# Patient Record
Sex: FEMALE | Race: WHITE | HISPANIC OR LATINO | Employment: UNEMPLOYED | URBAN - METROPOLITAN AREA
[De-identification: names, ages, dates, MRNs, and addresses within clinical notes are randomized per-mention and may not be internally consistent; named-entity substitution may affect disease eponyms.]

---
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
## 2021-02-09 ENCOUNTER — GYNECOLOGY VISIT (OUTPATIENT)
Dept: OBGYN | Facility: CLINIC | Age: 21
End: 2021-02-09

## 2021-02-09 VITALS — SYSTOLIC BLOOD PRESSURE: 118 MMHG | BODY MASS INDEX: 31.32 KG/M2 | DIASTOLIC BLOOD PRESSURE: 62 MMHG | WEIGHT: 200 LBS

## 2021-02-09 DIAGNOSIS — Z34.90 PREGNANCY, UNSPECIFIED GESTATIONAL AGE: ICD-10-CM

## 2021-02-09 PROCEDURE — 99203 OFFICE O/P NEW LOW 30 MIN: CPT | Mod: 25 | Performed by: OBSTETRICS & GYNECOLOGY

## 2021-02-09 PROCEDURE — 76857 US EXAM PELVIC LIMITED: CPT | Performed by: OBSTETRICS & GYNECOLOGY

## 2021-02-09 ASSESSMENT — FIBROSIS 4 INDEX: FIB4 SCORE: 0.21

## 2021-02-09 NOTE — NON-PROVIDER
Pt here for new pt/newob  Pt states  LMP: 09/10/2020  GA:21w6d  Pharmacy verified  Good #[phone]

## 2021-02-09 NOTE — PROGRESS NOTES
CC: Confirmation of viability    Thania Dias,  20 y.o.  female with Patient's last menstrual period was 09/10/2020. presents today with complaint of dysfunctional uterine bleeding.    Subjective : Patient presents to the office for absent menses.    Nausea/Vomiting: Sometimes    Abdominal /pelvic cramping: No  Vaginal bleeding: No  Positive home UPT october  Partner Franklyn  Other symptoms: denies    Pertinent positives documented in HPI and all other systems reviewed & are negative    OB History    Para Term  AB Living   2 1     0 1   SAB TAB Ectopic Molar Multiple Live Births   0       0 1      # Outcome Date GA Lbr Bran/2nd Weight Sex Delivery Anes PTL Lv   2 Current            1 Para 07/10/20 18w3d  0.175 kg (6.2 oz) F Vag-Spont None Y LIZETTE       Past Gyn history: last pap never, hx STDs denies    History reviewed. No pertinent past medical history.    History reviewed. No pertinent surgical history.    Meds: PNV    Allergies: Asa [aspirin]    Physical Exam:    /62   Wt 90.7 kg (200 lb)   LMP 09/10/2020   Breastfeeding No   BMI 31.32 kg/m²   Gen: well-appearing, well-hydrated, well-nourished  Abd: abdomen is soft without significant tenderness, masses, organomegaly or guarding  Pelvic: deferred  Ext: NT bilaterally, no cyanosis, clubbing or edema    No results found for this or any previous visit (from the past 336 hour(s)).    Transabdominal US performed and per my read:    Indication: confirmation of pregnancy     Findings: fink intrauterine pregnancy @ 21+6 by AC, HC, BPD, and FL  Positive fetal cardiac activity @ 156 BPM.   Normal amniotic fluid    Impression: viable IUP @ 21+5. EDC 2021 by LMP= 21 wk US      Assessment:  20 y.o. here for viability  Pregnancy exam/test positive   @ 21+5. EDC 2021 by LMP= 21 wk US    Plan:  1 week for new OB appt - labs ordered  US for anatomy ordered   Normal pregnancy symptoms discussed  SAB/labor precautions  educated  Call office w/ questions or concerns

## 2021-02-11 ENCOUNTER — HOSPITAL ENCOUNTER (OUTPATIENT)
Dept: LAB | Facility: MEDICAL CENTER | Age: 21
End: 2021-02-11
Attending: OBSTETRICS & GYNECOLOGY
Payer: COMMERCIAL

## 2021-02-11 ENCOUNTER — APPOINTMENT (OUTPATIENT)
Dept: OBGYN | Facility: CLINIC | Age: 21
End: 2021-02-11
Payer: MEDICAID

## 2021-02-11 DIAGNOSIS — Z34.90 PREGNANCY, UNSPECIFIED GESTATIONAL AGE: ICD-10-CM

## 2021-02-11 LAB
ABO GROUP BLD: NORMAL
APPEARANCE UR: CLEAR
BASOPHILS # BLD AUTO: 0.3 % (ref 0–1.8)
BASOPHILS # BLD: 0.02 K/UL (ref 0–0.12)
BILIRUB UR QL STRIP.AUTO: NEGATIVE
BLD GP AB SCN SERPL QL: NORMAL
COLOR UR: YELLOW
EOSINOPHIL # BLD AUTO: 0.03 K/UL (ref 0–0.51)
EOSINOPHIL NFR BLD: 0.4 % (ref 0–6.9)
ERYTHROCYTE [DISTWIDTH] IN BLOOD BY AUTOMATED COUNT: 42.6 FL (ref 35.9–50)
GLUCOSE UR STRIP.AUTO-MCNC: NEGATIVE MG/DL
HBV SURFACE AG SER QL: ABNORMAL
HCT VFR BLD AUTO: 37.5 % (ref 37–47)
HGB BLD-MCNC: 12.6 G/DL (ref 12–16)
HIV 1+2 AB+HIV1 P24 AG SERPL QL IA: NORMAL
IMM GRANULOCYTES # BLD AUTO: 0.03 K/UL (ref 0–0.11)
IMM GRANULOCYTES NFR BLD AUTO: 0.4 % (ref 0–0.9)
KETONES UR STRIP.AUTO-MCNC: NEGATIVE MG/DL
LEUKOCYTE ESTERASE UR QL STRIP.AUTO: NEGATIVE
LYMPHOCYTES # BLD AUTO: 1.14 K/UL (ref 1–4.8)
LYMPHOCYTES NFR BLD: 15.9 % (ref 22–41)
MCH RBC QN AUTO: 30.7 PG (ref 27–33)
MCHC RBC AUTO-ENTMCNC: 33.6 G/DL (ref 33.6–35)
MCV RBC AUTO: 91.5 FL (ref 81.4–97.8)
MICRO URNS: ABNORMAL
MONOCYTES # BLD AUTO: 0.49 K/UL (ref 0–0.85)
MONOCYTES NFR BLD AUTO: 6.8 % (ref 0–13.4)
NEUTROPHILS # BLD AUTO: 5.48 K/UL (ref 2–7.15)
NEUTROPHILS NFR BLD: 76.2 % (ref 44–72)
NITRITE UR QL STRIP.AUTO: NEGATIVE
NRBC # BLD AUTO: 0 K/UL
NRBC BLD-RTO: 0 /100 WBC
PH UR STRIP.AUTO: 8 [PH] (ref 5–8)
PLATELET # BLD AUTO: 296 K/UL (ref 164–446)
PMV BLD AUTO: 11.7 FL (ref 9–12.9)
PROT UR QL STRIP: NEGATIVE MG/DL
RBC # BLD AUTO: 4.1 M/UL (ref 4.2–5.4)
RBC UR QL AUTO: NEGATIVE
RH BLD: NORMAL
RUBV AB SER QL: 46 IU/ML
SP GR UR STRIP.AUTO: 1.02
TREPONEMA PALLIDUM IGG+IGM AB [PRESENCE] IN SERUM OR PLASMA BY IMMUNOASSAY: ABNORMAL
UROBILINOGEN UR STRIP.AUTO-MCNC: 0.2 MG/DL
WBC # BLD AUTO: 7.2 K/UL (ref 4.8–10.8)

## 2021-02-12 LAB
C TRACH DNA SPEC QL NAA+PROBE: NEGATIVE
N GONORRHOEA DNA SPEC QL NAA+PROBE: NEGATIVE
SPECIMEN SOURCE: NORMAL

## 2021-02-13 LAB
AMPHETAMINES UR QL: NEGATIVE NG/ML
BACTERIA UR CULT: NORMAL
BARBITURATES UR QL: NEGATIVE NG/ML
BENZODIAZ UR QL: NEGATIVE NG/ML
CANNABINOIDS UR QL SCN: NEGATIVE NG/ML
COCAINE UR QL: NEGATIVE NG/ML
DRUG SCREEN COMMENT UR-IMP: NORMAL
MDMA CTO UR SCN-MCNC: NEGATIVE NG/ML
METHADONE UR QL: NEGATIVE NG/ML
OPIATES UR QL: NEGATIVE NG/ML
OXYCODONE CTO UR SCN-MCNC: NEGATIVE NG/ML
PCP UR QL SCN: NEGATIVE NG/ML
PROPOXYPH UR QL: NEGATIVE NG/ML
SIGNIFICANT IND 70042: NORMAL
SITE SITE: NORMAL
SOURCE SOURCE: NORMAL

## 2021-02-14 LAB
# FETUSES US: NORMAL
AFP MOM SERPL: 0.59
AFP SERPL-MCNC: 33 NG/ML
AGE - REPORTED: 19.8 YR
CURRENT SMOKER: NO
FAMILY MEMBER DISEASES HX: NO
GA METHOD: NORMAL
GA: NORMAL WK
HCG MOM SERPL: 0.49
HCG SERPL-ACNC: 9190 IU/L
HX OF HEREDITARY DISORDERS: NO
IDDM PATIENT QL: NO
INHIBIN A MOM SERPL: 0.63
INHIBIN A SERPL-MCNC: 103 PG/ML
INTEGRATED SCN PATIENT-IMP: NORMAL
PATHOLOGY STUDY: NORMAL
SPECIMEN DRAWN SERPL: NORMAL
U ESTRIOL MOM SERPL: 0.95
U ESTRIOL SERPL-MCNC: 2.72 NG/ML

## 2021-02-16 ENCOUNTER — INITIAL PRENATAL (OUTPATIENT)
Dept: OBGYN | Facility: CLINIC | Age: 21
End: 2021-02-16

## 2021-02-16 VITALS
BODY MASS INDEX: 31.64 KG/M2 | SYSTOLIC BLOOD PRESSURE: 122 MMHG | HEIGHT: 67 IN | DIASTOLIC BLOOD PRESSURE: 68 MMHG | WEIGHT: 201.6 LBS

## 2021-02-16 DIAGNOSIS — Z34.90 PRENATAL CARE, ANTEPARTUM: ICD-10-CM

## 2021-02-16 DIAGNOSIS — Z87.59 HISTORY OF FETAL DEMISE, NOT CURRENTLY PREGNANT: ICD-10-CM

## 2021-02-16 LAB
APPEARANCE UR: NORMAL
BILIRUB UR STRIP-MCNC: NORMAL MG/DL
COLOR UR AUTO: NORMAL
GLUCOSE UR STRIP.AUTO-MCNC: NEGATIVE MG/DL
KETONES UR STRIP.AUTO-MCNC: NEGATIVE MG/DL
LEUKOCYTE ESTERASE UR QL STRIP.AUTO: NEGATIVE
NITRITE UR QL STRIP.AUTO: NORMAL
PH UR STRIP.AUTO: 6.5 [PH] (ref 5–8)
PROT UR QL STRIP: NEGATIVE MG/DL
RBC UR QL AUTO: NEGATIVE
SP GR UR STRIP.AUTO: 1.02
UROBILINOGEN UR STRIP-MCNC: NORMAL MG/DL

## 2021-02-16 PROCEDURE — 81002 URINALYSIS NONAUTO W/O SCOPE: CPT | Performed by: NURSE PRACTITIONER

## 2021-02-16 PROCEDURE — 0500F INITIAL PRENATAL CARE VISIT: CPT | Performed by: NURSE PRACTITIONER

## 2021-02-16 PROCEDURE — 90686 IIV4 VACC NO PRSV 0.5 ML IM: CPT | Performed by: NURSE PRACTITIONER

## 2021-02-16 PROCEDURE — 90471 IMMUNIZATION ADMIN: CPT | Performed by: NURSE PRACTITIONER

## 2021-02-16 ASSESSMENT — EDINBURGH POSTNATAL DEPRESSION SCALE (EPDS)
I HAVE LOOKED FORWARD WITH ENJOYMENT TO THINGS: AS MUCH AS I EVER DID
TOTAL SCORE: 0
THINGS HAVE BEEN GETTING ON TOP OF ME: NO, I HAVE BEEN COPING AS WELL AS EVER
I HAVE BEEN ABLE TO LAUGH AND SEE THE FUNNY SIDE OF THINGS: AS MUCH AS I ALWAYS COULD
THE THOUGHT OF HARMING MYSELF HAS OCCURRED TO ME: NEVER
I HAVE BEEN SO UNHAPPY THAT I HAVE HAD DIFFICULTY SLEEPING: NOT AT ALL
I HAVE BEEN SO UNHAPPY THAT I HAVE BEEN CRYING: NO, NEVER
I HAVE BEEN ANXIOUS OR WORRIED FOR NO GOOD REASON: NO, NOT AT ALL
I HAVE FELT SAD OR MISERABLE: NO, NOT AT ALL
I HAVE FELT SCARED OR PANICKY FOR NO GOOD REASON: NO, NOT AT ALL
I HAVE BLAMED MYSELF UNNECESSARILY WHEN THINGS WENT WRONG: NO, NEVER

## 2021-02-16 ASSESSMENT — FIBROSIS 4 INDEX: FIB4 SCORE: 0.23

## 2021-02-17 NOTE — PROGRESS NOTES
NOB today    LMP: 09/10/2020  Last pap: n/a  Phone # [phone]  Pharmacy confirmed  On PNV  Cystic Fibrosis test offered and Declined   Flu vaccine offered and given  AFP done 2021 Negative    Pt has anatomy u/s scheduled 2021  C/o:  Pt has no questions or concerns at this time     Flu Vaccine  NDC: 70583-803-91  LOT#: [id number]  Expiration Date: 2021    Dose: 0.5ML  Site: Right Arm    Patient educated on use and side effects of medication. Name and  verified prior to injection. Pt tolerated? YES     Verified by : KS    Administered by Griselda Padilla, Med Ass't at 4:24 PM.    Patient Provided Medication: no

## 2021-02-17 NOTE — PROGRESS NOTES
Subjective:   Thania Dias is a 20 y.o.  who presents for her new OB exam.  She is 22w5d with an LOUISE of Estimated Date of Delivery: 21 by LMP she was tracking. She is feeling well and has no concerns at this time. Denies VB, LOF, contractions or pain. No ER visits or previous care in this pregnancy. Denies dysuria, vaginal DC, fever. Reports  fetal movement. AFP WNL.  Declines CF.  No NIPT testing kits in clinic today.      History reviewed. No pertinent past medical history.    Psych Hx: Patient denies any history of depression, anxiety, PTSD, bipolar or any other psychological issues.       Past Surgical History:   Procedure Laterality Date   • OPEN REDUCTION      (L) wrist radius 2018        OB History    Para Term  AB Living   2 1     0 1   SAB TAB Ectopic Molar Multiple Live Births   0       0 1      # Outcome Date GA Lbr Bran/2nd Weight Sex Delivery Anes PTL Lv   2 Current            1 Para 07/10/20 18w3d  0.175 kg (6.2 oz) F Vag-Spont None Y LIZETTE        Gynecological Hx: Denies any hx of STIs, including HSV. Denies any vulvovaginal disorders and no hx of abnormal cervical cytology. Last pap n/a    Sexual Hx: One current male partner, who is FOB, same from other pregnancy.    Contraceptive Hx: Has not used in the past and has since discontinued use.     Family History   Problem Relation Age of Onset   • No Known Problems Mother    • No Known Problems Father    • No Known Problems Brother      Denies any genetic disorders in family history.     Social History     Socioeconomic History   • Marital status: Single     Spouse name: Not on file   • Number of children: Not on file   • Years of education: Not on file   • Highest education level: Not on file   Occupational History   • Not on file   Tobacco Use   • Smoking status: Never Smoker   • Smokeless tobacco: Never Used   Substance and Sexual Activity   • Alcohol use: Not Currently   • Drug use: Not Currently     Types:  "Marijuana, Inhaled     Comment: 07/2020   • Sexual activity: Not Currently     Partners: Male     Comment: Pregnancy Planned and Desired    Other Topics Concern   • Not on file   Social History Narrative   • Not on file     Social Determinants of Health     Financial Resource Strain:    • Difficulty of Paying Living Expenses:    Food Insecurity:    • Worried About Running Out of Food in the Last Year:    • Ran Out of Food in the Last Year:    Transportation Needs:    • Lack of Transportation (Medical):    • Lack of Transportation (Non-Medical):    Physical Activity:    • Days of Exercise per Week:    • Minutes of Exercise per Session:    Stress:    • Feeling of Stress :    Social Connections:    • Frequency of Communication with Friends and Family:    • Frequency of Social Gatherings with Friends and Family:    • Attends Hinduism Services:    • Active Member of Clubs or Organizations:    • Attends Club or Organization Meetings:    • Marital Status:    Intimate Partner Violence:    • Fear of Current or Ex-Partner:    • Emotionally Abused:    • Physically Abused:    • Sexually Abused:        FOB is involved and lives with Thania Dias.  Pregnancy is planned.    She is currently not working, denies any heavy lifting or exposure to potential teratogens like environmental or occupational toxins.   Denies alcohol use, drug use, or tobacco use in pregnancy.   Denies any current or hx of sexual, emotional or physical abuse or trauma.     Current Medications: PNV   Allergies aspirin     Objective:      Vitals:    02/16/21 1557   BP: 122/68   Weight: 91.4 kg (201 lb 9.6 oz)   Height: 1.702 m (5' 7\")        See Prenatal Physical and Prenatal Vitals  UA WNL today      Assessment:      1.  IUP @ 22w5d per LMP      2.  S=D      3.  See problem list as follows       Patient Active Problem List    Diagnosis Date Noted   • History of fetal demise at 18 weeks 02/16/2021         Plan:   - GC/CT done by urine  - AFP neg, " no NIPT kits in clinic today   - Prenatal labs ordered - lab slip given  - Discussed PNV, nutrition, adequate water intake, and exercise/weight gain in pregnancy  - NOB informational packet with anticipatory guidance given  - Information on Centering Pregnancy given, groups cancelled until further notice due to COVID-19   - S/sx of pregnancy warning signs and PTL precautions given  - Complete OB US already scheduled  - Return to [facility] in 4 weeks

## 2021-02-24 ENCOUNTER — ROUTINE PRENATAL (OUTPATIENT)
Dept: OBGYN | Facility: CLINIC | Age: 21
End: 2021-02-24

## 2021-02-24 ENCOUNTER — APPOINTMENT (OUTPATIENT)
Dept: RADIOLOGY | Facility: IMAGING CENTER | Age: 21
End: 2021-02-24
Attending: OBSTETRICS & GYNECOLOGY
Payer: MEDICAID

## 2021-02-24 ENCOUNTER — HOSPITAL ENCOUNTER (INPATIENT)
Facility: MEDICAL CENTER | Age: 21
LOS: 19 days | DRG: 832 | End: 2021-03-15
Attending: OBSTETRICS & GYNECOLOGY | Admitting: OBSTETRICS & GYNECOLOGY
Payer: MEDICAID

## 2021-02-24 DIAGNOSIS — O34.32 PREMATURE CERVICAL DILATION IN SECOND TRIMESTER: ICD-10-CM

## 2021-02-24 DIAGNOSIS — Z34.90 PREGNANCY, UNSPECIFIED GESTATIONAL AGE: ICD-10-CM

## 2021-02-24 LAB
ALBUMIN SERPL BCP-MCNC: 3.8 G/DL (ref 3.2–4.9)
ALBUMIN/GLOB SERPL: 1 G/DL
ALP SERPL-CCNC: 90 U/L (ref 30–99)
ALT SERPL-CCNC: 10 U/L (ref 2–50)
ANION GAP SERPL CALC-SCNC: 12 MMOL/L (ref 7–16)
APPEARANCE UR: CLEAR
AST SERPL-CCNC: 13 U/L (ref 12–45)
BASOPHILS # BLD AUTO: 0.2 % (ref 0–1.8)
BASOPHILS # BLD: 0.02 K/UL (ref 0–0.12)
BILIRUB SERPL-MCNC: <0.2 MG/DL (ref 0.1–1.5)
BILIRUB UR QL STRIP.AUTO: NEGATIVE
BUN SERPL-MCNC: 7 MG/DL (ref 8–22)
CALCIUM SERPL-MCNC: 9.9 MG/DL (ref 8.5–10.5)
CHLORIDE SERPL-SCNC: 102 MMOL/L (ref 96–112)
CO2 SERPL-SCNC: 22 MMOL/L (ref 20–33)
COLOR UR: YELLOW
CREAT SERPL-MCNC: 0.54 MG/DL (ref 0.5–1.4)
EOSINOPHIL # BLD AUTO: 0.04 K/UL (ref 0–0.51)
EOSINOPHIL NFR BLD: 0.5 % (ref 0–6.9)
ERYTHROCYTE [DISTWIDTH] IN BLOOD BY AUTOMATED COUNT: 42.7 FL (ref 35.9–50)
GLOBULIN SER CALC-MCNC: 3.9 G/DL (ref 1.9–3.5)
GLUCOSE BLD-MCNC: 152 MG/DL (ref 65–99)
GLUCOSE SERPL-MCNC: 88 MG/DL (ref 65–99)
GLUCOSE UR STRIP.AUTO-MCNC: NEGATIVE MG/DL
HCT VFR BLD AUTO: 39.3 % (ref 37–47)
HGB BLD-MCNC: 13.2 G/DL (ref 12–16)
HOLDING TUBE BB 8507: NORMAL
IMM GRANULOCYTES # BLD AUTO: 0.04 K/UL (ref 0–0.11)
IMM GRANULOCYTES NFR BLD AUTO: 0.5 % (ref 0–0.9)
KETONES UR STRIP.AUTO-MCNC: NEGATIVE MG/DL
LEUKOCYTE ESTERASE UR QL STRIP.AUTO: NEGATIVE
LYMPHOCYTES # BLD AUTO: 1.28 K/UL (ref 1–4.8)
LYMPHOCYTES NFR BLD: 15 % (ref 22–41)
MCH RBC QN AUTO: 30.9 PG (ref 27–33)
MCHC RBC AUTO-ENTMCNC: 33.6 G/DL (ref 33.6–35)
MCV RBC AUTO: 92 FL (ref 81.4–97.8)
MICRO URNS: NORMAL
MONOCYTES # BLD AUTO: 0.47 K/UL (ref 0–0.85)
MONOCYTES NFR BLD AUTO: 5.5 % (ref 0–13.4)
NEUTROPHILS # BLD AUTO: 6.66 K/UL (ref 2–7.15)
NEUTROPHILS NFR BLD: 78.3 % (ref 44–72)
NITRITE UR QL STRIP.AUTO: NEGATIVE
NRBC # BLD AUTO: 0 K/UL
NRBC BLD-RTO: 0 /100 WBC
PH UR STRIP.AUTO: 7.5 [PH] (ref 5–8)
PLATELET # BLD AUTO: 283 K/UL (ref 164–446)
PMV BLD AUTO: 11.1 FL (ref 9–12.9)
POTASSIUM SERPL-SCNC: 4 MMOL/L (ref 3.6–5.5)
PROT SERPL-MCNC: 7.7 G/DL (ref 6–8.2)
PROT UR QL STRIP: NEGATIVE MG/DL
RBC # BLD AUTO: 4.27 M/UL (ref 4.2–5.4)
RBC UR QL AUTO: NEGATIVE
SARS-COV+SARS-COV-2 AG RESP QL IA.RAPID: NOTDETECTED
SARS-COV-2 RNA RESP QL NAA+PROBE: NOTDETECTED
SODIUM SERPL-SCNC: 136 MMOL/L (ref 135–145)
SP GR UR STRIP.AUTO: 1.01
SPECIMEN SOURCE: NORMAL
SPECIMEN SOURCE: NORMAL
UROBILINOGEN UR STRIP.AUTO-MCNC: 0.2 MG/DL
WBC # BLD AUTO: 8.5 K/UL (ref 4.8–10.8)

## 2021-02-24 PROCEDURE — 82962 GLUCOSE BLOOD TEST: CPT

## 2021-02-24 PROCEDURE — 700105 HCHG RX REV CODE 258: Performed by: OBSTETRICS & GYNECOLOGY

## 2021-02-24 PROCEDURE — 76805 OB US >/= 14 WKS SNGL FETUS: CPT | Mod: TC | Performed by: OBSTETRICS & GYNECOLOGY

## 2021-02-24 PROCEDURE — 700111 HCHG RX REV CODE 636 W/ 250 OVERRIDE (IP)

## 2021-02-24 PROCEDURE — 87426 SARSCOV CORONAVIRUS AG IA: CPT

## 2021-02-24 PROCEDURE — U0005 INFEC AGEN DETEC AMPLI PROBE: HCPCS

## 2021-02-24 PROCEDURE — 700111 HCHG RX REV CODE 636 W/ 250 OVERRIDE (IP): Performed by: OBSTETRICS & GYNECOLOGY

## 2021-02-24 PROCEDURE — 85025 COMPLETE CBC W/AUTO DIFF WBC: CPT

## 2021-02-24 PROCEDURE — 59025 FETAL NON-STRESS TEST: CPT | Mod: 26 | Performed by: OBSTETRICS & GYNECOLOGY

## 2021-02-24 PROCEDURE — 36415 COLL VENOUS BLD VENIPUNCTURE: CPT

## 2021-02-24 PROCEDURE — 80053 COMPREHEN METABOLIC PANEL: CPT

## 2021-02-24 PROCEDURE — 700105 HCHG RX REV CODE 258

## 2021-02-24 PROCEDURE — 302128 INFUSION PUMP: Performed by: OBSTETRICS & GYNECOLOGY

## 2021-02-24 PROCEDURE — 76817 TRANSVAGINAL US OBSTETRIC: CPT | Mod: TC | Performed by: OBSTETRICS & GYNECOLOGY

## 2021-02-24 PROCEDURE — 770002 HCHG ROOM/CARE - OB PRIVATE (112)

## 2021-02-24 PROCEDURE — 90040 PR PRENATAL FOLLOW UP: CPT | Performed by: OBSTETRICS & GYNECOLOGY

## 2021-02-24 PROCEDURE — 83735 ASSAY OF MAGNESIUM: CPT

## 2021-02-24 PROCEDURE — A9270 NON-COVERED ITEM OR SERVICE: HCPCS | Performed by: OBSTETRICS & GYNECOLOGY

## 2021-02-24 PROCEDURE — 302790 HCHG STAT ANTEPARTUM CARE, DAILY

## 2021-02-24 PROCEDURE — 81003 URINALYSIS AUTO W/O SCOPE: CPT

## 2021-02-24 PROCEDURE — 96372 THER/PROPH/DIAG INJ SC/IM: CPT

## 2021-02-24 PROCEDURE — 700102 HCHG RX REV CODE 250 W/ 637 OVERRIDE(OP): Performed by: OBSTETRICS & GYNECOLOGY

## 2021-02-24 PROCEDURE — 99221 1ST HOSP IP/OBS SF/LOW 40: CPT | Mod: 25 | Performed by: OBSTETRICS & GYNECOLOGY

## 2021-02-24 PROCEDURE — U0003 INFECTIOUS AGENT DETECTION BY NUCLEIC ACID (DNA OR RNA); SEVERE ACUTE RESPIRATORY SYNDROME CORONAVIRUS 2 (SARS-COV-2) (CORONAVIRUS DISEASE [COVID-19]), AMPLIFIED PROBE TECHNIQUE, MAKING USE OF HIGH THROUGHPUT TECHNOLOGIES AS DESCRIBED BY CMS-2020-01-R: HCPCS

## 2021-02-24 RX ORDER — PENICILLIN G POTASSIUM 5000000 [IU]/1
2.5 INJECTION, POWDER, FOR SOLUTION INTRAMUSCULAR; INTRAVENOUS EVERY 4 HOURS
Status: DISCONTINUED | OUTPATIENT
Start: 2021-02-25 | End: 2021-02-25

## 2021-02-24 RX ORDER — MAGNESIUM SULFATE HEPTAHYDRATE 40 MG/ML
INJECTION, SOLUTION INTRAVENOUS
Status: COMPLETED
Start: 2021-02-24 | End: 2021-02-24

## 2021-02-24 RX ORDER — MAGNESIUM SULFATE HEPTAHYDRATE 40 MG/ML
4 INJECTION, SOLUTION INTRAVENOUS ONCE
Status: COMPLETED | OUTPATIENT
Start: 2021-02-24 | End: 2021-02-24

## 2021-02-24 RX ORDER — INDOMETHACIN 50 MG/1
50 CAPSULE ORAL
Status: DISCONTINUED | OUTPATIENT
Start: 2021-02-24 | End: 2021-02-26

## 2021-02-24 RX ORDER — MAGNESIUM SULFATE HEPTAHYDRATE 40 MG/ML
2 INJECTION, SOLUTION INTRAVENOUS ONCE
Status: COMPLETED | OUTPATIENT
Start: 2021-02-24 | End: 2021-02-24

## 2021-02-24 RX ORDER — SODIUM CHLORIDE, SODIUM LACTATE, POTASSIUM CHLORIDE, CALCIUM CHLORIDE 600; 310; 30; 20 MG/100ML; MG/100ML; MG/100ML; MG/100ML
INJECTION, SOLUTION INTRAVENOUS CONTINUOUS
Status: DISCONTINUED | OUTPATIENT
Start: 2021-02-24 | End: 2021-02-24

## 2021-02-24 RX ORDER — INDOMETHACIN 50 MG/1
50 CAPSULE ORAL
Status: DISCONTINUED | OUTPATIENT
Start: 2021-02-24 | End: 2021-02-24

## 2021-02-24 RX ORDER — PENICILLIN G POTASSIUM 5000000 [IU]/1
5 INJECTION, POWDER, FOR SOLUTION INTRAMUSCULAR; INTRAVENOUS ONCE
Status: COMPLETED | OUTPATIENT
Start: 2021-02-24 | End: 2021-02-24

## 2021-02-24 RX ORDER — MAGNESIUM SULFATE HEPTAHYDRATE 40 MG/ML
2 INJECTION, SOLUTION INTRAVENOUS CONTINUOUS
Status: DISCONTINUED | OUTPATIENT
Start: 2021-02-24 | End: 2021-02-27

## 2021-02-24 RX ORDER — SODIUM CHLORIDE, SODIUM LACTATE, POTASSIUM CHLORIDE, CALCIUM CHLORIDE 600; 310; 30; 20 MG/100ML; MG/100ML; MG/100ML; MG/100ML
INJECTION, SOLUTION INTRAVENOUS CONTINUOUS
Status: DISCONTINUED | OUTPATIENT
Start: 2021-02-24 | End: 2021-02-26

## 2021-02-24 RX ORDER — ONDANSETRON 2 MG/ML
4 INJECTION INTRAMUSCULAR; INTRAVENOUS EVERY 6 HOURS PRN
Status: DISCONTINUED | OUTPATIENT
Start: 2021-02-24 | End: 2021-03-15 | Stop reason: HOSPADM

## 2021-02-24 RX ORDER — SODIUM CHLORIDE 9 MG/ML
INJECTION, SOLUTION INTRAVENOUS
Status: COMPLETED
Start: 2021-02-24 | End: 2021-02-24

## 2021-02-24 RX ORDER — BETAMETHASONE SODIUM PHOSPHATE AND BETAMETHASONE ACETATE 3; 3 MG/ML; MG/ML
12 INJECTION, SUSPENSION INTRA-ARTICULAR; INTRALESIONAL; INTRAMUSCULAR; SOFT TISSUE EVERY 24 HOURS
Status: COMPLETED | OUTPATIENT
Start: 2021-02-24 | End: 2021-02-25

## 2021-02-24 RX ADMIN — MAGNESIUM SULFATE IN WATER 4 G: 40 INJECTION, SOLUTION INTRAVENOUS at 19:09

## 2021-02-24 RX ADMIN — SODIUM CHLORIDE, POTASSIUM CHLORIDE, SODIUM LACTATE AND CALCIUM CHLORIDE: 600; 310; 30; 20 INJECTION, SOLUTION INTRAVENOUS at 17:26

## 2021-02-24 RX ADMIN — MAGNESIUM SULFATE IN WATER 2 G/HR: 40 INJECTION, SOLUTION INTRAVENOUS at 19:39

## 2021-02-24 RX ADMIN — INDOMETHACIN 50 MG: 50 CAPSULE ORAL at 20:53

## 2021-02-24 RX ADMIN — MAGNESIUM SULFATE IN WATER 2 G: 40 INJECTION, SOLUTION INTRAVENOUS at 19:29

## 2021-02-24 RX ADMIN — MAGNESIUM SULFATE HEPTAHYDRATE 2 G/HR: 40 INJECTION, SOLUTION INTRAVENOUS at 19:39

## 2021-02-24 RX ADMIN — SODIUM CHLORIDE 100 ML: 900 INJECTION INTRAVENOUS at 20:54

## 2021-02-24 RX ADMIN — BETAMETHASONE ACETATE AND BETAMETHASONE SODIUM PHOSPHATE 12 MG: 3; 3 INJECTION, SUSPENSION INTRA-ARTICULAR; INTRALESIONAL; INTRAMUSCULAR; SOFT TISSUE at 15:30

## 2021-02-24 RX ADMIN — MAGNESIUM SULFATE HEPTAHYDRATE 2 G: 40 INJECTION, SOLUTION INTRAVENOUS at 19:29

## 2021-02-24 RX ADMIN — PENICILLIN G POTASSIUM 5 MILLION UNITS: 5000000 POWDER, FOR SOLUTION INTRAMUSCULAR; INTRAPLEURAL; INTRATHECAL; INTRAVENOUS at 20:54

## 2021-02-24 RX ADMIN — INDOMETHACIN 50 MG: 50 CAPSULE ORAL at 16:13

## 2021-02-24 ASSESSMENT — LIFESTYLE VARIABLES
EVER_SMOKED: NEVER
ALCOHOL_USE: NO

## 2021-02-24 ASSESSMENT — FIBROSIS 4 INDEX: FIB4 SCORE: 0.23

## 2021-02-24 NOTE — H&P
ANTEPARTUM HISTORY AND PHYSICAL NOTE;    Thania Dias is a 20 y.o. female  at 23w6d.  Patient was sent from women's health on [facility]-patient had ultrasound today-fetal survey.  Cervical measurement showed cervix almost completely effaced.  Cervical exam performed by Dr. Moyer-1 cm / 80% effaced patient denies uterine contractions or leakage of fluid denies vaginal bleeding having good fetal movement  Patient did have previous pregnancy complicated by rapid dilatation to complete with contractions at 18 weeks with her last pregnancy.    Patient Active Problem List    Diagnosis Date Noted   • History of fetal demise at 18 weeks 2021       Review of systems; denies vaginal bleeding, leakage of fluid, uterine contractions, fever chills or abdominal pain  No past medical history on file.  Past Surgical History:   Procedure Laterality Date   • OPEN REDUCTION      (L) wrist radius 2018     Asa [aspirin]  Social History     Socioeconomic History   • Marital status: Single     Spouse name: Not on file   • Number of children: Not on file   • Years of education: Not on file   • Highest education level: Not on file   Occupational History   • Not on file   Tobacco Use   • Smoking status: Never Smoker   • Smokeless tobacco: Never Used   Substance and Sexual Activity   • Alcohol use: Not Currently   • Drug use: Not Currently     Types: Marijuana, Inhaled     Comment: 2020   • Sexual activity: Not Currently     Partners: Male     Comment: Pregnancy Planned and Desired    Other Topics Concern   • Not on file   Social History Narrative   • Not on file     Social Determinants of Health     Financial Resource Strain:    • Difficulty of Paying Living Expenses:    Food Insecurity:    • Worried About Running Out of Food in the Last Year:    • Ran Out of Food in the Last Year:    Transportation Needs:    • Lack of Transportation (Medical):    • Lack of Transportation (Non-Medical):    Physical Activity:   "  • Days of Exercise per Week:    • Minutes of Exercise per Session:    Stress:    • Feeling of Stress :    Social Connections:    • Frequency of Communication with Friends and Family:    • Frequency of Social Gatherings with Friends and Family:    • Attends Mandaen Services:    • Active Member of Clubs or Organizations:    • Attends Club or Organization Meetings:    • Marital Status:    Intimate Partner Violence:    • Fear of Current or Ex-Partner:    • Emotionally Abused:    • Physically Abused:    • Sexually Abused:          Physical examination;  Alert and oriented x3  Gen.-well-developed well-nourished female in no apparent distress  HEENT-normocephalic, nontraumatic,EOMI,PERRLA  /69   Pulse 96   Temp 36.7 °C (98.1 °F) (Temporal)   Ht 1.702 m (5' 7\")   Wt 91.4 kg (201 lb 9.6 oz)   LMP 09/10/2020   SpO2 98%   BMI 31.57 kg/m²   Skin is warm and dry  Back-negative for CVA tenderness  Cardiovascular-regular rate and rhythm, normal S1-S2 no murmurs gallops  Lungs-clear to auscultation bilaterally  Abdomen-nondistended positive bowel sounds soft nontender without masses or hepatosplenomegaly  Cervix-1 cm / 80% effaced per Dr. Moyer  Extremities without cyanosis clubbing or edema  Neurologic grossly intact    Labs;      NST-as performed and read by myself; reactive NST without contractions    Impression;  IUP AT 23w6d   labor versus incompetent cervix  History of previous 18-week delivery    Plan;  Admission for close observation  Betamethasone series  Indomethacin 50 mg p.o. every 6 hours x72 hours  [facility] consultation for possible cerclage        Jason Mercado MD  "

## 2021-02-24 NOTE — PROGRESS NOTES
Had US today and sent over by Graciela due to no measurable cervix and the cervix being open  .75cm.  Pt does not feel ctx or cramping, no signs or symptoms of infection.     Has a hx of 18 week loss, placenta confirmed chorio.  No other pregnancies.    SVE 1/80/floating    Provided options to the patient and this is a very desired pregnancy for her and she would like all options done.  She will proceed to L&D for evaluation by MFM for possible cerclage if deemed appropriate. Beta to be given.      Verona Moyer D.O.

## 2021-02-25 LAB
GLUCOSE BLD-MCNC: 130 MG/DL (ref 65–99)
GLUCOSE BLD-MCNC: 138 MG/DL (ref 65–99)
GLUCOSE BLD-MCNC: 138 MG/DL (ref 65–99)
GLUCOSE BLD-MCNC: 142 MG/DL (ref 65–99)
MAGNESIUM SERPL-MCNC: 5 MG/DL (ref 1.5–2.5)
MAGNESIUM SERPL-MCNC: 5.8 MG/DL (ref 1.5–2.5)
MAGNESIUM SERPL-MCNC: 6.1 MG/DL (ref 1.5–2.5)
MAGNESIUM SERPL-MCNC: 6.4 MG/DL (ref 1.5–2.5)
MAGNESIUM SERPL-MCNC: 7.2 MG/DL (ref 1.5–2.5)

## 2021-02-25 PROCEDURE — 36415 COLL VENOUS BLD VENIPUNCTURE: CPT

## 2021-02-25 PROCEDURE — 770002 HCHG ROOM/CARE - OB PRIVATE (112)

## 2021-02-25 PROCEDURE — 99232 SBSQ HOSP IP/OBS MODERATE 35: CPT | Performed by: OBSTETRICS & GYNECOLOGY

## 2021-02-25 PROCEDURE — 700102 HCHG RX REV CODE 250 W/ 637 OVERRIDE(OP): Performed by: OBSTETRICS & GYNECOLOGY

## 2021-02-25 PROCEDURE — 302790 HCHG STAT ANTEPARTUM CARE, DAILY

## 2021-02-25 PROCEDURE — 83735 ASSAY OF MAGNESIUM: CPT | Mod: 91

## 2021-02-25 PROCEDURE — A9270 NON-COVERED ITEM OR SERVICE: HCPCS | Performed by: OBSTETRICS & GYNECOLOGY

## 2021-02-25 PROCEDURE — 700105 HCHG RX REV CODE 258: Performed by: OBSTETRICS & GYNECOLOGY

## 2021-02-25 PROCEDURE — 700111 HCHG RX REV CODE 636 W/ 250 OVERRIDE (IP): Performed by: OBSTETRICS & GYNECOLOGY

## 2021-02-25 PROCEDURE — 82962 GLUCOSE BLOOD TEST: CPT

## 2021-02-25 RX ORDER — SODIUM CHLORIDE 9 MG/ML
INJECTION, SOLUTION INTRAVENOUS
Status: ACTIVE
Start: 2021-02-25 | End: 2021-02-25

## 2021-02-25 RX ADMIN — SODIUM CHLORIDE, POTASSIUM CHLORIDE, SODIUM LACTATE AND CALCIUM CHLORIDE: 600; 310; 30; 20 INJECTION, SOLUTION INTRAVENOUS at 09:05

## 2021-02-25 RX ADMIN — SODIUM CHLORIDE 2.5 MILLION UNITS: 9 INJECTION, SOLUTION INTRAVENOUS at 14:04

## 2021-02-25 RX ADMIN — INDOMETHACIN 50 MG: 50 CAPSULE ORAL at 11:53

## 2021-02-25 RX ADMIN — SODIUM CHLORIDE 2.5 MILLION UNITS: 9 INJECTION, SOLUTION INTRAVENOUS at 22:21

## 2021-02-25 RX ADMIN — INDOMETHACIN 50 MG: 50 CAPSULE ORAL at 21:37

## 2021-02-25 RX ADMIN — MAGNESIUM SULFATE HEPTAHYDRATE 3 G/HR: 40 INJECTION, SOLUTION INTRAVENOUS at 09:05

## 2021-02-25 RX ADMIN — BETAMETHASONE ACETATE AND BETAMETHASONE SODIUM PHOSPHATE 12 MG: 3; 3 INJECTION, SUSPENSION INTRA-ARTICULAR; INTRALESIONAL; INTRAMUSCULAR; SOFT TISSUE at 15:28

## 2021-02-25 RX ADMIN — INDOMETHACIN 50 MG: 50 CAPSULE ORAL at 07:42

## 2021-02-25 RX ADMIN — INDOMETHACIN 50 MG: 50 CAPSULE ORAL at 17:30

## 2021-02-25 RX ADMIN — SODIUM CHLORIDE 2.5 MILLION UNITS: 9 INJECTION, SOLUTION INTRAVENOUS at 10:07

## 2021-02-25 RX ADMIN — SODIUM CHLORIDE 2.5 MILLION UNITS: 9 INJECTION, SOLUTION INTRAVENOUS at 17:31

## 2021-02-25 RX ADMIN — SODIUM CHLORIDE 2.5 MILLION UNITS: 9 INJECTION, SOLUTION INTRAVENOUS at 01:30

## 2021-02-25 RX ADMIN — SODIUM CHLORIDE 2.5 MILLION UNITS: 9 INJECTION, SOLUTION INTRAVENOUS at 06:00

## 2021-02-25 ASSESSMENT — PATIENT HEALTH QUESTIONNAIRE - PHQ9
1. LITTLE INTEREST OR PLEASURE IN DOING THINGS: NOT AT ALL
2. FEELING DOWN, DEPRESSED, IRRITABLE, OR HOPELESS: NOT AT ALL
SUM OF ALL RESPONSES TO PHQ9 QUESTIONS 1 AND 2: 0

## 2021-02-25 NOTE — CONSULTS
DATE OF SERVICE:  2021     REQUESTING PHYSICIAN:  Jason Mercado MD     REASON FOR CONSULTATION:  Assist in management for patient at 23 weeks and 6   days with cervical funneling and shortening.     HISTORY OF PRESENT ILLNESS:  This is a 20-year-old  2, para 0, AB 1   with late onset of prenatal care currently with EDC 2021, now 23 weeks   and 6 days.     The patient's dating is based on an LMP and an ultrasound done at 21 weeks and   6 days on , which is consistent with working LOUISE.     The patient today had an ultrasound which showed evidence of cervical   funneling and was examined by Dr. Moyer who said that the patient was 1 cm   dilated, 80% effaced.  The patient apparently denies any leakage of fluid or   any evidence of bleeding or contractions.     PAST MEDICAL HISTORY:  Denies any major medical problems including asthma,   seizures, hypertension, cardiovascular, GI, or  diseases.     OPERATIONS:  None.     PAST OBSTETRICAL HISTORY:  In , she delivered an 18-week fetus when she   presented with bleeding and rapid delivery.     TRANSFUSIONS:  None.     OPERATIONS:  None.  ALLERGIES:  ASPIRIN CAUSES ANAPHYLAXIS.     VENEREAL DISEASE HISTORY:  Negative.     HABITS:  Denies current drug use including alcohol, marijuana, tobacco or   recreational drugs; however, she admits to history of marijuana use in the   past.     PHYSICAL EXAMINATION:  GENERAL:  Well-developed, well-nourished female, alert and oriented x3.  HEENT:  Within normal limits.     Bedside ultrasound revealed fink fetus in a breech presentation, anterior   placenta, normal subjective amniotic fluid.  Composite gestational age 24   weeks and 1 day. Estimated fetal weight is 743 grams.  Fetal anatomy appears   to be appropriate for age and was consistent with a female.  Transvaginal   ultrasound revealed the cervix to show funneling with the internal os at 8.2   mm, depth of forming 26.1 mm and remaining  cervix at 2.9 mm.  However, the   patient was observed to have periodic uterine contractions and reached a   maximum cervical dilatation at 17.0 mm with complete funneling of 31.6 mm.  No   remaining cervix.     ASSESSMENT:  1.  Intrauterine pregnancy at 23 weeks and 6 days.  2.  Probable cervical incompetence.  3.  Uterine contractions, rule out secondary to cervical dilatation versus   primary  labor.  4.  Late prenatal care.  5.  History of previous second trimester loss.     PLAN:  At the present time, they informed the patient the first step that   needs to be accomplished is that of stabilization and that is to resolve her   uterine contractions.  Based on the ultrasound findings, she is que   every 2-3 minutes and with impressive change of the cervix.  Because of the   presence of contractions, she is not a candidate for a cerclage at this point.   Attempt should be made to stabilize and I discussed the case with Dr. Jason Mercado and recommended magnesium sulfate.  She may continue with the   indomethacin at this time.  We also need to consider betamethasone, which has   already been started and we discussed the rationale for this.  The GBS   prophylaxis and Penicillin was also recommended.     I informed the patient that she is in a very difficult situation as we were   entering viability and she is actively que.  The purpose of   stabilization with magnesium sulfate for neuroprotection as well as tocolysis   was discussed and hopefully, we can gain a fair amount of time.  She would be   considered too far along for cerclage in most cases.  At the present time due   to the severity of the change, I doubt that she will stabilized   sufficiently for a cerclage to be placed even in the best of   circumstances.     We discussed that this is very guarded prognosis.  This is a day-by-day   assessment.  All questions answered to her satisfaction.  Translation was   used.    TIME:  120  MINUTES    ______________________________  MD PATRICIO ELKINS    DD:  02/24/2021 20:23  DT:  02/24/2021 22:46    Job#:  [id number]

## 2021-02-25 NOTE — PROGRESS NOTES
19 y/o , EDC 21, EGA 23.6. Pt sent from office for possible cerclage placement due no measurable cervix upon US and SVE of . Pt has a hx of a loss at 18 weeks.   1430 - Pt arrived, monitors placed. [id number] Devaughn  used. Pt denies any UCs, cramping, bleeding, or LOF. Pt states normal vaginal discharge.   3651 - [id number] Edward  used. Dr. Mercado updated on pt arrival. Orders received to begin IV, send labs, covid, and give Betamethasone steroids. Orders completed.  5890 - [id number] Delvin  used. Dr. Mercado at bedside, sterile spec done by MD, no membranes visualized. Orders received to give Indomethacin, verified with pharmacy due to allergy to ASA. Indomethacin explained to pt, all questions answered.  1615 - Admit profile completed,  [id number] Hector used. POC again explained. No questions from pt or FOB. Pt understands to remain NPO and that Dr. Landa will be by later tonight to assess.   1725 - Orders to begin IV fluids received.  Vianney [id number] used, pt updated, denies any questions.    - Dr. Landa at bedside. [id number] Milagros  used. Abd US done by MD. Order received to begin Mag due to UCs and place pandya before transvaginal US.  [id number] Hector used to explain to pt. Pt misunderstood that the medication was used to cause contractions not stop them, magnesium re explained to pt. RN and MD again explained to pt that the magnesium is to help slow the contractions and for neuroprotection. Pt had better understanding and comfortable with plan.    - Magnesium began, pandya placed.    - Dr. Landa back to bedside for transvaginal US. Cheyenne Burns RN  at bedside for interpreting. Findings of US explained to pt. Plan to continue Magnesium, Betamethasone Steroids, have NICU consult, and begin GBS prophylaxis with PCN, and check blood sugars fasting and 1 hr post meal. Okay for pt to have regular diet.    - Report given  to KEV Acharya RN.

## 2021-02-25 NOTE — CARE PLAN
Problem: Risk for Infection, Impaired Wound Healing  Goal: Remain free from signs and symptoms of infection  Outcome: PROGRESSING AS EXPECTED  Note: Pt monitored for s/s of infection. None at this time.      Problem: Pain  Goal: Alleviation of Pain or a reduction in pain to the patient's comfort goal  Outcome: PROGRESSING AS EXPECTED  Note: Pt denies any pain at this time. Pt understands to let RN know if she is feeling any pain.

## 2021-02-25 NOTE — CARE PLAN
Problem: Safety  Goal: Will remain free from injury  Note: Pt educated about safety plan, and patient calls out with assistance.   Outcome: PROGRESSING AS EXPECTED     Problem: Infection  Note: Pt remains free from signs/symptoms of infection, pt afebrile.   Goal: Will remain free from infection  Outcome: PROGRESSING AS EXPECTED

## 2021-02-25 NOTE — PROGRESS NOTES
0700: Report received from KEV Acharya RN. Pt resting at this time.     0742: FSBS 130    1005:  services used at this time for assessment and to address any questions or concerns.  ID [id number], name Liza. Pt declines UC's, LOF, VB, and states +FM. Questions addressed regarding medications, and POC. Pt encouraged to call out with any needs, and if she starts to feel any contractions/cramping.     1900: Report given KEV Acharya.

## 2021-02-25 NOTE — PROGRESS NOTES
2230 Report received from ISIDORO Bolanos RN. Patient is resting is bed with Fob at bedside.     0700 Report given to RACH De Jesus RN

## 2021-02-26 LAB
GLUCOSE BLD-MCNC: 124 MG/DL (ref 65–99)
GLUCOSE BLD-MCNC: 124 MG/DL (ref 65–99)
GLUCOSE BLD-MCNC: 133 MG/DL (ref 65–99)
GLUCOSE BLD-MCNC: 168 MG/DL (ref 65–99)
MAGNESIUM SERPL-MCNC: 5.1 MG/DL (ref 1.5–2.5)
MAGNESIUM SERPL-MCNC: 5.4 MG/DL (ref 1.5–2.5)
MAGNESIUM SERPL-MCNC: 5.6 MG/DL (ref 1.5–2.5)

## 2021-02-26 PROCEDURE — 700111 HCHG RX REV CODE 636 W/ 250 OVERRIDE (IP): Performed by: OBSTETRICS & GYNECOLOGY

## 2021-02-26 PROCEDURE — 36415 COLL VENOUS BLD VENIPUNCTURE: CPT

## 2021-02-26 PROCEDURE — A9270 NON-COVERED ITEM OR SERVICE: HCPCS | Performed by: OBSTETRICS & GYNECOLOGY

## 2021-02-26 PROCEDURE — 82962 GLUCOSE BLOOD TEST: CPT | Mod: 91

## 2021-02-26 PROCEDURE — 700105 HCHG RX REV CODE 258: Performed by: OBSTETRICS & GYNECOLOGY

## 2021-02-26 PROCEDURE — 700102 HCHG RX REV CODE 250 W/ 637 OVERRIDE(OP): Performed by: OBSTETRICS & GYNECOLOGY

## 2021-02-26 PROCEDURE — 99231 SBSQ HOSP IP/OBS SF/LOW 25: CPT | Performed by: OBSTETRICS & GYNECOLOGY

## 2021-02-26 PROCEDURE — 770002 HCHG ROOM/CARE - OB PRIVATE (112)

## 2021-02-26 PROCEDURE — 302790 HCHG STAT ANTEPARTUM CARE, DAILY

## 2021-02-26 PROCEDURE — 83735 ASSAY OF MAGNESIUM: CPT

## 2021-02-26 RX ADMIN — SODIUM CHLORIDE 2.5 MILLION UNITS: 9 INJECTION, SOLUTION INTRAVENOUS at 02:07

## 2021-02-26 RX ADMIN — SODIUM CHLORIDE 2.5 MILLION UNITS: 9 INJECTION, SOLUTION INTRAVENOUS at 13:30

## 2021-02-26 RX ADMIN — SODIUM CHLORIDE 2.5 MILLION UNITS: 9 INJECTION, SOLUTION INTRAVENOUS at 06:29

## 2021-02-26 RX ADMIN — SODIUM CHLORIDE, POTASSIUM CHLORIDE, SODIUM LACTATE AND CALCIUM CHLORIDE: 600; 310; 30; 20 INJECTION, SOLUTION INTRAVENOUS at 02:07

## 2021-02-26 RX ADMIN — MAGNESIUM SULFATE HEPTAHYDRATE 2 G/HR: 40 INJECTION, SOLUTION INTRAVENOUS at 02:10

## 2021-02-26 RX ADMIN — INDOMETHACIN 50 MG: 50 CAPSULE ORAL at 13:34

## 2021-02-26 RX ADMIN — INDOMETHACIN 50 MG: 50 CAPSULE ORAL at 08:32

## 2021-02-26 RX ADMIN — SODIUM CHLORIDE 2.5 MILLION UNITS: 9 INJECTION, SOLUTION INTRAVENOUS at 09:30

## 2021-02-26 ASSESSMENT — FIBROSIS 4 INDEX: FIB4 SCORE: 0.28

## 2021-02-26 NOTE — PROGRESS NOTES
0700. Report from Leana JEFFERS RN. POC discussed and resumed.     0832. At the bedside, pt is refusing the  ipad and want to use her phone. Pt has no needs this time and denies uc's, leaking or bleeding. Updated the pt that we will be turning off the magnesium at 1530 today. Pt has no questions or needs at this time.     1530. Mag, PCN, and indomethacin discontinued per Dr. Mancia.     1830. Dr. Landa at the bedside.  line used. [place] #[id number]    1900. Report Leana JEFFERS to JUN. POC discussed.

## 2021-02-26 NOTE — PROGRESS NOTES
Thania Plummer Arun   24w1d  Admission DX: Antepartum abnormality of cervix [O34.40]  Admitted for  cervical dilation    Date of Admission: 2021  Patient Active Problem List    Diagnosis Date Noted   • History of fetal demise at 18 weeks 2021       Subjective: Feeling well.  Does not feel contractions.  Is worried about delivering the baby.  uterine contractions:no  pain: .no  LOF: no  vaginal Bleeding: no  fetal movement: normal    Objective:   Vitals:    21 0617 21 0618 21 0620 21 0818   BP:  110/62  130/64   Pulse: 67 64 85 86   Resp:    17   Temp:    36.9 °C (98.5 °F)   TempSrc:    Temporal   SpO2: 96%      Weight:       Height:         Category 2, appropriate for gestational age, reassuring  Gen: No acute distress, resting comfortably  Abdomen: gravid, soft, NT  Ext: SCDs on, Nt, no cyanosis or clubbing    Meds:     Current Facility-Administered Medications:   •  penicillin G potassium 2.5 Million Units in  mL IVPB, 2.5 Million Units, Intravenous, Q4HRS, Cesar Landa M.D., Stopped at 21 0659  •  indomethacin (INDOCIN) capsule 50 mg, 50 mg, Oral, 4X/DAY WITH MEALS + NIGHTLY, Jason Mercado M.D., 50 mg at 21 0832  •  lactated ringers infusion, , Intravenous, Continuous, Cesar Landa M.D., Last Rate: 50 mL/hr at 21 020, New Bag at 21 020  •  magnesium sulfate 40 g/1000mL infusion, 2 g/hr, Intravenous, Continuous, Cesar Landa M.D., Last Rate: 50 mL/hr at 21 0210, 2 g/hr at 21 021  •  ondansetron (ZOFRAN) syringe/vial injection 4 mg, 4 mg, Intravenous, Q6HRS PRN, Cesar Landa M.D.    Lab:   Recent Results (from the past 72 hour(s))   SARS-COV Antigen ELIAZAR: Collect dry nasal swab AND NP swab in VT    Collection Time: 21  3:20 PM   Result Value Ref Range    SARS-CoV-2 Source Nasal Swab     SARS-COV ANTIGEN ELIAZAR NotDetected Not-Detected   SARS-CoV-2 PCR (24 hour In-House): Collect NP swab in VTM    Collection Time:  02/24/21  3:20 PM    Specimen: Nasopharyngeal; Respirate   Result Value Ref Range    SARS-CoV-2 Source NP Swab     SARS-CoV-2 by PCR NotDetected    CBC WITH DIFFERENTIAL    Collection Time: 02/24/21  3:30 PM   Result Value Ref Range    WBC 8.5 4.8 - 10.8 K/uL    RBC 4.27 4.20 - 5.40 M/uL    Hemoglobin 13.2 12.0 - 16.0 g/dL    Hematocrit 39.3 37.0 - 47.0 %    MCV 92.0 81.4 - 97.8 fL    MCH 30.9 27.0 - 33.0 pg    MCHC 33.6 33.6 - 35.0 g/dL    RDW 42.7 35.9 - 50.0 fL    Platelet Count 283 164 - 446 K/uL    MPV 11.1 9.0 - 12.9 fL    Neutrophils-Polys 78.30 (H) 44.00 - 72.00 %    Lymphocytes 15.00 (L) 22.00 - 41.00 %    Monocytes 5.50 0.00 - 13.40 %    Eosinophils 0.50 0.00 - 6.90 %    Basophils 0.20 0.00 - 1.80 %    Immature Granulocytes 0.50 0.00 - 0.90 %    Nucleated RBC 0.00 /100 WBC    Neutrophils (Absolute) 6.66 2.00 - 7.15 K/uL    Lymphs (Absolute) 1.28 1.00 - 4.80 K/uL    Monos (Absolute) 0.47 0.00 - 0.85 K/uL    Eos (Absolute) 0.04 0.00 - 0.51 K/uL    Baso (Absolute) 0.02 0.00 - 0.12 K/uL    Immature Granulocytes (abs) 0.04 0.00 - 0.11 K/uL    NRBC (Absolute) 0.00 K/uL   Comp Metabolic Panel    Collection Time: 02/24/21  3:30 PM   Result Value Ref Range    Sodium 136 135 - 145 mmol/L    Potassium 4.0 3.6 - 5.5 mmol/L    Chloride 102 96 - 112 mmol/L    Co2 22 20 - 33 mmol/L    Anion Gap 12.0 7.0 - 16.0    Glucose 88 65 - 99 mg/dL    Bun 7 (L) 8 - 22 mg/dL    Creatinine 0.54 0.50 - 1.40 mg/dL    Calcium 9.9 8.5 - 10.5 mg/dL    AST(SGOT) 13 12 - 45 U/L    ALT(SGPT) 10 2 - 50 U/L    Alkaline Phosphatase 90 30 - 99 U/L    Total Bilirubin <0.2 0.1 - 1.5 mg/dL    Albumin 3.8 3.2 - 4.9 g/dL    Total Protein 7.7 6.0 - 8.2 g/dL    Globulin 3.9 (H) 1.9 - 3.5 g/dL    A-G Ratio 1.0 g/dL   HOLD BLOOD BANK SPECIMEN (NOT TESTED)    Collection Time: 02/24/21  3:30 PM   Result Value Ref Range    Holding Tube - Bb DONE    ESTIMATED GFR    Collection Time: 02/24/21  3:30 PM   Result Value Ref Range    GFR If  >60  >60 mL/min/1.73 m 2    GFR If Non African American >60 >60 mL/min/1.73 m 2   URINALYSIS    Collection Time: 02/24/21  3:45 PM    Specimen: Urine, Clean Catch   Result Value Ref Range    Color Yellow     Character Clear     Specific Gravity 1.015 <1.035    Ph 7.5 5.0 - 8.0    Glucose Negative Negative mg/dL    Ketones Negative Negative mg/dL    Protein Negative Negative mg/dL    Bilirubin Negative Negative    Urobilinogen, Urine 0.2 Negative    Nitrite Negative Negative    Leukocyte Esterase Negative Negative    Occult Blood Negative Negative    Micro Urine Req see below    SERUM MAGNESIUM LEVEL 2 HRS     Collection Time: 02/24/21 10:01 PM   Result Value Ref Range    Magnesium 5.0 (H) 1.5 - 2.5 mg/dL   ACCU-CHEK GLUCOSE    Collection Time: 02/24/21 11:24 PM   Result Value Ref Range    Glucose - Accu-Ck 152 (H) 65 - 99 mg/dL   SERUM MAGNESIUM LEVELS     Collection Time: 02/25/21  3:29 AM   Result Value Ref Range    Magnesium 6.1 (HH) 1.5 - 2.5 mg/dL   ACCU-CHEK GLUCOSE    Collection Time: 02/25/21  7:42 AM   Result Value Ref Range    Glucose - Accu-Ck 130 (H) 65 - 99 mg/dL   SERUM MAGNESIUM LEVELS     Collection Time: 02/25/21  9:31 AM   Result Value Ref Range    Magnesium 6.4 (HH) 1.5 - 2.5 mg/dL   ACCU-CHEK GLUCOSE    Collection Time: 02/25/21 10:02 AM   Result Value Ref Range    Glucose - Accu-Ck 138 (H) 65 - 99 mg/dL   ACCU-CHEK GLUCOSE    Collection Time: 02/25/21  1:16 PM   Result Value Ref Range    Glucose - Accu-Ck 138 (H) 65 - 99 mg/dL   SERUM MAGNESIUM LEVELS     Collection Time: 02/25/21  3:34 PM   Result Value Ref Range    Magnesium 7.2 (HH) 1.5 - 2.5 mg/dL   ACCU-CHEK GLUCOSE    Collection Time: 02/25/21  7:46 PM   Result Value Ref Range    Glucose - Accu-Ck 142 (H) 65 - 99 mg/dL   SERUM MAGNESIUM LEVELS     Collection Time: 02/25/21  9:54 PM   Result Value Ref Range    Magnesium 5.8 (HH) 1.5 - 2.5 mg/dL   SERUM MAGNESIUM LEVELS     Collection Time: 02/26/21  3:42 AM   Result Value Ref Range    Magnesium  5.6 (HH) 1.5 - 2.5 mg/dL   ACCU-CHEK GLUCOSE    Collection Time: 21  7:14 AM   Result Value Ref Range    Glucose - Accu-Ck 124 (H) 65 - 99 mg/dL       A/P:  20 y.o.  @ 24w1d with  cervical dilation  Antepartum abnormality of cervix [O34.40]  -Patient has a dynamic cervix and therefore Dr. Landa did not place a cerclage at 23+6 weeks  -Status post betamethasone dosing, expectant management  -Continue magnesium for now  -NICU consult pending  - fetal monitoring: Continuous  - GBS pending    dispo: Inpatient for now    Verona Moyer D.O.  [facility], Women's [facility]

## 2021-02-26 NOTE — CONSULTS
NEONATOLOGY CONSULT NOTE  2021 9:27 PM    Asked by Dr. Landa to see this patient due to  labor, cervical incompetence. Patient is a 19 yo old  (2020 birth at 18w, chorio), now pregnant at 24w0d gestation with current pregnancy complicated by  labor, cervix funneling. She is s/p 2 doses of BMTZ (completed this after noon), currently on PCN, indocin, Mg. Discussed problems of prematurity at this gestation with patient and baby's father (who was on SpeakerPhone) with the assistance of iPad . Reviewed resuscitation, including intubation and chest compressions, mechanical ventilation, CLD, PDA, IVH, NEC, nutrition and expected length of stay to equal approximately baby's due date. Mother relates this a very desired pregnancy and she wants everything possible done for the baby, including full rescuscitaion. She plans to pump BM.     Per NICHD calculator, given EFW, female sex, gestational age 24w, and steroids, 74% survival, 26% moderate to severe neurodevelopmental impairements, 25% CP. Discussed potential long-term complications, and reviewed ND impairment, CP, learning disability. Thank you for allowing us to be involved in the care of this patient and her family.    Electronically signed by:  Nazia Martinez MD  Neonatologist

## 2021-02-26 NOTE — PROGRESS NOTES
1900: Received report from ANN MARIE Blanco RN. POC discussed.     0700: Report given to RIVKA Junior RN.

## 2021-02-26 NOTE — PROGRESS NOTES
1651 Dr. Landa notified of Magnesium level, orders to d/c x 30 minutes then restart at 2 grams/hr  8220 Finished with dinner tray

## 2021-02-27 LAB
GLUCOSE BLD-MCNC: 110 MG/DL (ref 65–99)
GLUCOSE BLD-MCNC: 96 MG/DL (ref 65–99)
GLUCOSE BLD-MCNC: 97 MG/DL (ref 65–99)
GLUCOSE BLD-MCNC: 99 MG/DL (ref 65–99)

## 2021-02-27 PROCEDURE — 82962 GLUCOSE BLOOD TEST: CPT | Mod: 91

## 2021-02-27 PROCEDURE — 770002 HCHG ROOM/CARE - OB PRIVATE (112)

## 2021-02-27 PROCEDURE — 302790 HCHG STAT ANTEPARTUM CARE, DAILY

## 2021-02-27 PROCEDURE — 59025 FETAL NON-STRESS TEST: CPT | Mod: 26 | Performed by: OBSTETRICS & GYNECOLOGY

## 2021-02-27 PROCEDURE — 99231 SBSQ HOSP IP/OBS SF/LOW 25: CPT | Mod: 25 | Performed by: OBSTETRICS & GYNECOLOGY

## 2021-02-27 ASSESSMENT — PAIN DESCRIPTION - PAIN TYPE
TYPE: ACUTE PAIN
TYPE: ACUTE PAIN

## 2021-02-27 ASSESSMENT — PATIENT HEALTH QUESTIONNAIRE - PHQ9
2. FEELING DOWN, DEPRESSED, IRRITABLE, OR HOPELESS: NOT AT ALL
SUM OF ALL RESPONSES TO PHQ9 QUESTIONS 1 AND 2: 0
2. FEELING DOWN, DEPRESSED, IRRITABLE, OR HOPELESS: NOT AT ALL
1. LITTLE INTEREST OR PLEASURE IN DOING THINGS: NOT AT ALL
SUM OF ALL RESPONSES TO PHQ9 QUESTIONS 1 AND 2: 0
1. LITTLE INTEREST OR PLEASURE IN DOING THINGS: NOT AT ALL

## 2021-02-27 NOTE — PROGRESS NOTES
0700- Report received from JUN Dueñas. POC discussed and assumed.   0730- Pt sleeping soundly without any needs at this time.   0950- Assessment completed. Pt reports +FM and denies any LOV, VB or UC's at this time. TOCO in place and adjusted. EFM placed for fetal monitoring. 18 G PIV SL.   1025- Dr Condon called and requested to review strip. Baseline of 140 bpm with moderate variability, two variables noted. Peak of variable down to 120's with total time of decel around 40 sec. Orders to allow pt to come off monitors and she will review.   1900- Report given to JUN Maloney.

## 2021-02-27 NOTE — PROGRESS NOTES
S: No complaints.  Not aware of contractions.  O: Afebrile        EFM: Moderate variability and accelerations present.  A: IUP 24 1/7 weeks       Cervical funneling         Pt not aware of contractions.  P:  Expectant management.  If recurs in  labor, restart magnesium sulfate for neuroprotection.        Pt will need to be inpatient care for the remainder of the pregnancy.    Time: 15 minutes.

## 2021-02-27 NOTE — CARE PLAN
Problem: Infection  Goal: Will remain free from infection  Outcome: PROGRESSING AS EXPECTED     Problem: Pain  Goal: Alleviation of Pain or a reduction in pain to the patient's comfort goal  Outcome: PROGRESSING AS EXPECTED

## 2021-02-27 NOTE — PROGRESS NOTES
Antepartum Progress Note    Thania Dias is a  at 24w1d  Date of Admission: 2021    Subjective:   Patient seen today.  No further contractions.  Magnesium, penicillin, and indomethacin were discontinued this afternoon.  States that she would like to shower.    ROS  uterine contractions:no  pain: .no  LOF: no  vaginal Bleeding: no  fetal movement: normal    Objective:   Vitals:    21 0918 21 1218 21 1618 21 1925   BP: 129/64 126/61 123/68 125/65   Pulse: 77 69 70 80   Resp:  18 17    Temp:  36.8 °C (98.2 °F) 37.4 °C (99.4 °F) 36.9 °C (98.5 °F)   TempSrc:  Temporal Temporal Temporal   SpO2:       Weight:       Height:           Gen: AAO in NAD  Membranes: ruptured: no  Abdomen: gravid, soft, NT  Ext: SCDs on, Nt, no cyanosis or clubbing    NST: 145 / mod bernardino / non reactive with occasional variables   Overall category II but appropriate for gestational age.   [place]: quiet      Meds:     Current Facility-Administered Medications:   •  magnesium sulfate 40 g/1000mL infusion, 2 g/hr, Intravenous, Continuous, Cesar Landa M.D., Stopped at 21 1537  •  ondansetron (ZOFRAN) syringe/vial injection 4 mg, 4 mg, Intravenous, Q6HRS PRN, Cesar Landa M.D.    Lab:   Recent Results (from the past 72 hour(s))   SARS-COV Antigen ELIAZAR: Collect dry nasal swab AND NP swab in VTM    Collection Time: 21  3:20 PM   Result Value Ref Range    SARS-CoV-2 Source Nasal Swab     SARS-COV ANTIGEN ELIAZAR NotDetected Not-Detected   SARS-CoV-2 PCR (24 hour In-House): Collect NP swab in VTM    Collection Time: 21  3:20 PM    Specimen: Nasopharyngeal; Respirate   Result Value Ref Range    SARS-CoV-2 Source NP Swab     SARS-CoV-2 by PCR NotDetected    CBC WITH DIFFERENTIAL    Collection Time: 21  3:30 PM   Result Value Ref Range    WBC 8.5 4.8 - 10.8 K/uL    RBC 4.27 4.20 - 5.40 M/uL    Hemoglobin 13.2 12.0 - 16.0 g/dL    Hematocrit 39.3 37.0 - 47.0 %    MCV 92.0 81.4 - 97.8 fL    MCH  30.9 27.0 - 33.0 pg    MCHC 33.6 33.6 - 35.0 g/dL    RDW 42.7 35.9 - 50.0 fL    Platelet Count 283 164 - 446 K/uL    MPV 11.1 9.0 - 12.9 fL    Neutrophils-Polys 78.30 (H) 44.00 - 72.00 %    Lymphocytes 15.00 (L) 22.00 - 41.00 %    Monocytes 5.50 0.00 - 13.40 %    Eosinophils 0.50 0.00 - 6.90 %    Basophils 0.20 0.00 - 1.80 %    Immature Granulocytes 0.50 0.00 - 0.90 %    Nucleated RBC 0.00 /100 WBC    Neutrophils (Absolute) 6.66 2.00 - 7.15 K/uL    Lymphs (Absolute) 1.28 1.00 - 4.80 K/uL    Monos (Absolute) 0.47 0.00 - 0.85 K/uL    Eos (Absolute) 0.04 0.00 - 0.51 K/uL    Baso (Absolute) 0.02 0.00 - 0.12 K/uL    Immature Granulocytes (abs) 0.04 0.00 - 0.11 K/uL    NRBC (Absolute) 0.00 K/uL   Comp Metabolic Panel    Collection Time: 02/24/21  3:30 PM   Result Value Ref Range    Sodium 136 135 - 145 mmol/L    Potassium 4.0 3.6 - 5.5 mmol/L    Chloride 102 96 - 112 mmol/L    Co2 22 20 - 33 mmol/L    Anion Gap 12.0 7.0 - 16.0    Glucose 88 65 - 99 mg/dL    Bun 7 (L) 8 - 22 mg/dL    Creatinine 0.54 0.50 - 1.40 mg/dL    Calcium 9.9 8.5 - 10.5 mg/dL    AST(SGOT) 13 12 - 45 U/L    ALT(SGPT) 10 2 - 50 U/L    Alkaline Phosphatase 90 30 - 99 U/L    Total Bilirubin <0.2 0.1 - 1.5 mg/dL    Albumin 3.8 3.2 - 4.9 g/dL    Total Protein 7.7 6.0 - 8.2 g/dL    Globulin 3.9 (H) 1.9 - 3.5 g/dL    A-G Ratio 1.0 g/dL   HOLD BLOOD BANK SPECIMEN (NOT TESTED)    Collection Time: 02/24/21  3:30 PM   Result Value Ref Range    Holding Tube - Bb DONE    ESTIMATED GFR    Collection Time: 02/24/21  3:30 PM   Result Value Ref Range    GFR If African American >60 >60 mL/min/1.73 m 2    GFR If Non African American >60 >60 mL/min/1.73 m 2   URINALYSIS    Collection Time: 02/24/21  3:45 PM    Specimen: Urine, Clean Catch   Result Value Ref Range    Color Yellow     Character Clear     Specific Gravity 1.015 <1.035    Ph 7.5 5.0 - 8.0    Glucose Negative Negative mg/dL    Ketones Negative Negative mg/dL    Protein Negative Negative mg/dL    Bilirubin  Negative Negative    Urobilinogen, Urine 0.2 Negative    Nitrite Negative Negative    Leukocyte Esterase Negative Negative    Occult Blood Negative Negative    Micro Urine Req see below    SERUM MAGNESIUM LEVEL 2 HRS     Collection Time: 02/24/21 10:01 PM   Result Value Ref Range    Magnesium 5.0 (H) 1.5 - 2.5 mg/dL   ACCU-CHEK GLUCOSE    Collection Time: 02/24/21 11:24 PM   Result Value Ref Range    Glucose - Accu-Ck 152 (H) 65 - 99 mg/dL   SERUM MAGNESIUM LEVELS     Collection Time: 02/25/21  3:29 AM   Result Value Ref Range    Magnesium 6.1 (HH) 1.5 - 2.5 mg/dL   ACCU-CHEK GLUCOSE    Collection Time: 02/25/21  7:42 AM   Result Value Ref Range    Glucose - Accu-Ck 130 (H) 65 - 99 mg/dL   SERUM MAGNESIUM LEVELS     Collection Time: 02/25/21  9:31 AM   Result Value Ref Range    Magnesium 6.4 (HH) 1.5 - 2.5 mg/dL   ACCU-CHEK GLUCOSE    Collection Time: 02/25/21 10:02 AM   Result Value Ref Range    Glucose - Accu-Ck 138 (H) 65 - 99 mg/dL   ACCU-CHEK GLUCOSE    Collection Time: 02/25/21  1:16 PM   Result Value Ref Range    Glucose - Accu-Ck 138 (H) 65 - 99 mg/dL   SERUM MAGNESIUM LEVELS     Collection Time: 02/25/21  3:34 PM   Result Value Ref Range    Magnesium 7.2 (HH) 1.5 - 2.5 mg/dL   ACCU-CHEK GLUCOSE    Collection Time: 02/25/21  7:46 PM   Result Value Ref Range    Glucose - Accu-Ck 142 (H) 65 - 99 mg/dL   SERUM MAGNESIUM LEVELS     Collection Time: 02/25/21  9:54 PM   Result Value Ref Range    Magnesium 5.8 (HH) 1.5 - 2.5 mg/dL   SERUM MAGNESIUM LEVELS     Collection Time: 02/26/21  3:42 AM   Result Value Ref Range    Magnesium 5.6 (HH) 1.5 - 2.5 mg/dL   ACCU-CHEK GLUCOSE    Collection Time: 02/26/21  7:14 AM   Result Value Ref Range    Glucose - Accu-Ck 124 (H) 65 - 99 mg/dL   ACCU-CHEK GLUCOSE    Collection Time: 02/26/21  9:06 AM   Result Value Ref Range    Glucose - Accu-Ck 168 (H) 65 - 99 mg/dL   SERUM MAGNESIUM LEVELS     Collection Time: 02/26/21 10:15 AM   Result Value Ref Range    Magnesium 5.1 (HH) 1.5  - 2.5 mg/dL   ACCU-CHEK GLUCOSE    Collection Time: 21  1:20 PM   Result Value Ref Range    Glucose - Accu-Ck 133 (H) 65 - 99 mg/dL   SERUM MAGNESIUM LEVELS     Collection Time: 21  3:28 PM   Result Value Ref Range    Magnesium 5.4 (HH) 1.5 - 2.5 mg/dL   ACCU-CHEK GLUCOSE    Collection Time: 21  6:11 PM   Result Value Ref Range    Glucose - Accu-Ck 124 (H) 65 - 99 mg/dL       Assessment:  Thania Dias is a  at 24w1d admitted with  dilation    Plan:  # Fetal Well Being  - s/p beta methasone course on -  -Status post magnesium as well as indomethacin today  -GBS is pending.  Penicillin has not been discontinued.  -Appreciate maternal-fetal medicine consultation.  Cerclage was deferred as cervix found to be dynamic however [facility] recommends inpatient management for now.  -A positive blood type  -Continuous monitoring    #Hospital issues  -Regular diet  -Bathroom privileges and may shower  -Ambulate with assistance  -SCDs while in bed

## 2021-02-27 NOTE — PROGRESS NOTES
1900: Received report from RIVKA Junior RN. POC discussed.    0700: Report given to LEONELA Bowen RN.

## 2021-02-28 LAB
GLUCOSE BLD-MCNC: 103 MG/DL (ref 65–99)
GLUCOSE BLD-MCNC: 93 MG/DL (ref 65–99)

## 2021-02-28 PROCEDURE — 82962 GLUCOSE BLOOD TEST: CPT | Mod: 91

## 2021-02-28 PROCEDURE — 770002 HCHG ROOM/CARE - OB PRIVATE (112)

## 2021-02-28 PROCEDURE — 99231 SBSQ HOSP IP/OBS SF/LOW 25: CPT | Performed by: OBSTETRICS & GYNECOLOGY

## 2021-02-28 PROCEDURE — 302790 HCHG STAT ANTEPARTUM CARE, DAILY

## 2021-02-28 RX ORDER — VITAMIN A ACETATE, BETA CAROTENE, ASCORBIC ACID, CHOLECALCIFEROL, .ALPHA.-TOCOPHEROL ACETATE, DL-, THIAMINE MONONITRATE, RIBOFLAVIN, NIACINAMIDE, PYRIDOXINE HYDROCHLORIDE, FOLIC ACID, CYANOCOBALAMIN, CALCIUM CARBONATE, FERROUS FUMARATE, ZINC OXIDE, CUPRIC OXIDE 3080; 12; 120; 400; 1; 1.84; 3; 20; 22; 920; 25; 200; 27; 10; 2 [IU]/1; UG/1; MG/1; [IU]/1; MG/1; MG/1; MG/1; MG/1; MG/1; [IU]/1; MG/1; MG/1; MG/1; MG/1; MG/1
1 TABLET, FILM COATED ORAL
Status: DISCONTINUED | OUTPATIENT
Start: 2021-03-01 | End: 2021-03-15 | Stop reason: HOSPADM

## 2021-02-28 ASSESSMENT — PAIN DESCRIPTION - PAIN TYPE
TYPE: ACUTE PAIN

## 2021-02-28 ASSESSMENT — PATIENT HEALTH QUESTIONNAIRE - PHQ9
1. LITTLE INTEREST OR PLEASURE IN DOING THINGS: NOT AT ALL
SUM OF ALL RESPONSES TO PHQ9 QUESTIONS 1 AND 2: 0
2. FEELING DOWN, DEPRESSED, IRRITABLE, OR HOPELESS: NOT AT ALL
1. LITTLE INTEREST OR PLEASURE IN DOING THINGS: NOT AT ALL
SUM OF ALL RESPONSES TO PHQ9 QUESTIONS 1 AND 2: 0
2. FEELING DOWN, DEPRESSED, IRRITABLE, OR HOPELESS: NOT AT ALL

## 2021-02-28 NOTE — PROGRESS NOTES
Thania Dias   24w2d    Subjective: Denies contractions, leaking of fluid, vaginal bleeding.  The baby is active.     Objective:   Vitals:    21 0430 21 0756 21 1150 21 1615   BP: 112/59 115/61 131/62 116/56   Pulse: (!) 56 76 83 (!) 54   Resp:  18 17 17   Temp: 37.7 °C (99.8 °F) 37.3 °C (99.1 °F) 37.1 °C (98.7 °F) 37.3 °C (99.1 °F)   TempSrc: Temporal Temporal Temporal Temporal   SpO2:       Weight:       Height:         General -alert, in no apparent distress  Abdomen -gravid, soft, nontender, nondistended  Extremities -no edema, calves nontender    NST INTERPRETATION - PER MY READ    INDICATIONS:  labor  UTERINE ACTIVITY: None  BASELINE: 140s  VARIABILITY: moderate  ACCELERATIONS: present  DECELERATIONS: mild variables  CATEGORY 2 TRACING, reassuring for gestational age      Lab:   Recent Results (from the past 48 hour(s))   SERUM MAGNESIUM LEVELS     Collection Time: 21  9:54 PM   Result Value Ref Range    Magnesium 5.8 (HH) 1.5 - 2.5 mg/dL   SERUM MAGNESIUM LEVELS     Collection Time: 21  3:42 AM   Result Value Ref Range    Magnesium 5.6 (HH) 1.5 - 2.5 mg/dL   ACCU-CHEK GLUCOSE    Collection Time: 21  7:14 AM   Result Value Ref Range    Glucose - Accu-Ck 124 (H) 65 - 99 mg/dL   ACCU-CHEK GLUCOSE    Collection Time: 21  9:06 AM   Result Value Ref Range    Glucose - Accu-Ck 168 (H) 65 - 99 mg/dL   SERUM MAGNESIUM LEVELS     Collection Time: 21 10:15 AM   Result Value Ref Range    Magnesium 5.1 (HH) 1.5 - 2.5 mg/dL   ACCU-CHEK GLUCOSE    Collection Time: 21  1:20 PM   Result Value Ref Range    Glucose - Accu-Ck 133 (H) 65 - 99 mg/dL   SERUM MAGNESIUM LEVELS     Collection Time: 21  3:28 PM   Result Value Ref Range    Magnesium 5.4 (HH) 1.5 - 2.5 mg/dL   ACCU-CHEK GLUCOSE    Collection Time: 21  6:11 PM   Result Value Ref Range    Glucose - Accu-Ck 124 (H) 65 - 99 mg/dL   ACCU-CHEK GLUCOSE    Collection Time: 21  6:13  AM   Result Value Ref Range    Glucose - Accu-Ck 96 65 - 99 mg/dL   ACCU-CHEK GLUCOSE    Collection Time: 21 10:42 AM   Result Value Ref Range    Glucose - Accu-Ck 97 65 - 99 mg/dL   ACCU-CHEK GLUCOSE    Collection Time: 21  1:06 PM   Result Value Ref Range    Glucose - Accu-Ck 99 65 - 99 mg/dL   ACCU-CHEK GLUCOSE    Collection Time: 21  7:02 PM   Result Value Ref Range    Glucose - Accu-Ck 110 (H) 65 - 99 mg/dL       Assessment/Plan   20-year-old -0-1-0 at 24w2d admitted for  labor.  She is status post betamethasone  and , magnesium, and indomethacin.  GBS appears to have not been collected.  Will discontinue penicillin, as patient is stable.  We will continue in-house observation as per MFM recommendations.

## 2021-02-28 NOTE — PROGRESS NOTES
Thania Dias   24w3d  Admission DX: Antepartum abnormality of cervix [O34.40]    Date of Admission: 2/24/2021  Patient Active Problem List    Diagnosis Date Noted   • History of fetal demise at 18 weeks 02/16/2021       Subjective: Doing well, no concerns.  No questions  uterine contractions:no  pain: .no  LOF: no  vaginal Bleeding: no  fetal movement: normal    Objective:   Vitals:    02/27/21 2009 02/27/21 2355 02/28/21 0415 02/28/21 0741   BP: 126/59 116/58 114/63 119/56   Pulse: 80 71 83 68   Resp: 18 18 16 17   Temp: 36.6 °C (97.8 °F) 37.3 °C (99.1 °F) 36.4 °C (97.6 °F) 37 °C (98.6 °F)   TempSrc: Temporal Temporal Oral Temporal   SpO2:       Weight:       Height:         Cat II with some variables expected for GA  Gen: NAD  Abdomen: gravid, soft, NT  Ext: SCDs on, Nt, no cyanosis or clubbing    Meds:     Current Facility-Administered Medications:   •  ondansetron (ZOFRAN) syringe/vial injection 4 mg, 4 mg, Intravenous, Q6HRS PRN, Cesar Landa M.D.    Labs:    Lab:   Recent Results (from the past 72 hour(s))   ACCU-CHEK GLUCOSE    Collection Time: 02/25/21  1:16 PM   Result Value Ref Range    Glucose - Accu-Ck 138 (H) 65 - 99 mg/dL   SERUM MAGNESIUM LEVELS     Collection Time: 02/25/21  3:34 PM   Result Value Ref Range    Magnesium 7.2 (HH) 1.5 - 2.5 mg/dL   ACCU-CHEK GLUCOSE    Collection Time: 02/25/21  7:46 PM   Result Value Ref Range    Glucose - Accu-Ck 142 (H) 65 - 99 mg/dL   SERUM MAGNESIUM LEVELS     Collection Time: 02/25/21  9:54 PM   Result Value Ref Range    Magnesium 5.8 (HH) 1.5 - 2.5 mg/dL   SERUM MAGNESIUM LEVELS     Collection Time: 02/26/21  3:42 AM   Result Value Ref Range    Magnesium 5.6 (HH) 1.5 - 2.5 mg/dL   ACCU-CHEK GLUCOSE    Collection Time: 02/26/21  7:14 AM   Result Value Ref Range    Glucose - Accu-Ck 124 (H) 65 - 99 mg/dL   ACCU-CHEK GLUCOSE    Collection Time: 02/26/21  9:06 AM   Result Value Ref Range    Glucose - Accu-Ck 168 (H) 65 - 99 mg/dL   SERUM MAGNESIUM  LEVELS     Collection Time: 21 10:15 AM   Result Value Ref Range    Magnesium 5.1 (HH) 1.5 - 2.5 mg/dL   ACCU-CHEK GLUCOSE    Collection Time: 21  1:20 PM   Result Value Ref Range    Glucose - Accu-Ck 133 (H) 65 - 99 mg/dL   SERUM MAGNESIUM LEVELS     Collection Time: 21  3:28 PM   Result Value Ref Range    Magnesium 5.4 (HH) 1.5 - 2.5 mg/dL   ACCU-CHEK GLUCOSE    Collection Time: 21  6:11 PM   Result Value Ref Range    Glucose - Accu-Ck 124 (H) 65 - 99 mg/dL   ACCU-CHEK GLUCOSE    Collection Time: 21  6:13 AM   Result Value Ref Range    Glucose - Accu-Ck 96 65 - 99 mg/dL   ACCU-CHEK GLUCOSE    Collection Time: 21 10:42 AM   Result Value Ref Range    Glucose - Accu-Ck 97 65 - 99 mg/dL   ACCU-CHEK GLUCOSE    Collection Time: 21  1:06 PM   Result Value Ref Range    Glucose - Accu-Ck 99 65 - 99 mg/dL   ACCU-CHEK GLUCOSE    Collection Time: 21  7:02 PM   Result Value Ref Range    Glucose - Accu-Ck 110 (H) 65 - 99 mg/dL   ACCU-CHEK GLUCOSE    Collection Time: 21  7:00 AM   Result Value Ref Range    Glucose - Accu-Ck 93 65 - 99 mg/dL   ACCU-CHEK GLUCOSE    Collection Time: 21  8:56 AM   Result Value Ref Range    Glucose - Accu-Ck 103 (H) 65 - 99 mg/dL       A/P:  20 y.o.  @ 24w3d by LMP with cervical funneling  Antepartum abnormality of cervix [O34.40]    - fetal monitoring: Every shift  - GBS pending    dispo: Due to the early dilation, recommendations from maternal-fetal medicine is for inpatient monitoring until delivery.    Verona Moyer D.O.  [facility], Women's Health

## 2021-02-28 NOTE — CARE PLAN
Problem: Risk for Infection, Impaired Wound Healing  Goal: Remain free from signs and symptoms of infection  Outcome: PROGRESSING AS EXPECTED  Note: Assessing and monitoring patient for s/s of infection and implementing precautions as needed. Educating patient and family concerning importance of hand hygiene     Problem: Pain  Goal: Alleviation of Pain or a reduction in pain to the patient's comfort goal  Outcome: PROGRESSING AS EXPECTED  Note: Assessing and monitoring patient's pain and implementing pharmacologic and nonpharmacologic means of pain management as needed. Educating patient concerning pain management options

## 2021-02-28 NOTE — PROGRESS NOTES
Late Entry  1900) Report received from LEONELA Bowen RN, POC discussed, assumed care of patient at this time  ) VS obtained and stable. Assessment performed. Patient is a  EDC  which makes her 24.2 weeks. Patient denies any vaginal bleeding, LOF or contractions at this time. Patient has no abdomen tenderness. States positive fetal movement. FOB at bedside. EFM placed on patient to obtain 20 minute monitoring strip per MD order. Patient denies any needs or complaints at this time  0655) Report to LEONELA Bowen RN, POC discussed

## 2021-02-28 NOTE — PROGRESS NOTES
0700- Report received from JUN Maloney. POC discussed and assumed.   0750- Assessment completed. Pt reports +FM and denies any LOF, VB or feeling any contractions or pain. TOCO in place. VSS.   1055- EFM placed for monitoring.   1156- Dr Moyer called and requested to review strip due to variables. Strip is broken due to pt movement and pt sitting up for eating. Will keep monitors on till Dr Moyer able to evaluate.   1240- Dr Moyer reviewed strip and is ok with monitors being removed. BS reviewed and orders to d/c fasting and 1 hr PP blood sugars.   1900- Report given to JUN Mnotaño.

## 2021-03-01 PROCEDURE — 87150 DNA/RNA AMPLIFIED PROBE: CPT

## 2021-03-01 PROCEDURE — 99231 SBSQ HOSP IP/OBS SF/LOW 25: CPT | Mod: 25 | Performed by: OBSTETRICS & GYNECOLOGY

## 2021-03-01 PROCEDURE — 59025 FETAL NON-STRESS TEST: CPT | Mod: 26 | Performed by: OBSTETRICS & GYNECOLOGY

## 2021-03-01 PROCEDURE — A9270 NON-COVERED ITEM OR SERVICE: HCPCS | Performed by: OBSTETRICS & GYNECOLOGY

## 2021-03-01 PROCEDURE — 87081 CULTURE SCREEN ONLY: CPT

## 2021-03-01 PROCEDURE — 302790 HCHG STAT ANTEPARTUM CARE, DAILY

## 2021-03-01 PROCEDURE — 770002 HCHG ROOM/CARE - OB PRIVATE (112)

## 2021-03-01 PROCEDURE — 700102 HCHG RX REV CODE 250 W/ 637 OVERRIDE(OP): Performed by: OBSTETRICS & GYNECOLOGY

## 2021-03-01 RX ADMIN — PRENATAL WITH FERROUS FUM AND FOLIC ACID 1 TABLET: 3080; 920; 120; 400; 22; 1.84; 3; 20; 10; 1; 12; 200; 27; 25; 2 TABLET ORAL at 10:36

## 2021-03-01 ASSESSMENT — PAIN DESCRIPTION - PAIN TYPE: TYPE: ACUTE PAIN

## 2021-03-01 NOTE — PROGRESS NOTES
0715- Report received from LEONELA Bowen RN.  Pt and FOB sleeping.  0900- Pt and FOB sleeping.  1000- Pt denies LOF, VB, or UC's. Pt reports +FM.  Pt denies discomfort at this time.  1015- US and toco on.  1320- GBS collected and sent to lab, per Dr. Driscoll.  1425- Pt notified RN of white discharge while using the restroom, NO LOF, no VB present. Pt reports +FM.  Pt denies cramping or discomfort at this time.  1735- Dr. Driscoll updated on pt's 2/24 US breech presentation, and MD updated on pt's vaginal discharge.  1900- Report given to LEONELA Montero RN.

## 2021-03-01 NOTE — PROGRESS NOTES
OB Progress Note:    Date of Admission: 2021    Subjective:   uterine contractions:no  pain: .no  LOF: no  vaginal Bleeding: no  fetal movement: normal    Objective:   24hr VS:  Temp:  [36.2 °C (97.2 °F)-37 °C (98.6 °F)] 36.7 °C (98.1 °F)  Pulse:  [57-86] 67  Resp:  [16-18] 17  BP: (113-127)/(59-66) 122/59    Gen: AAO, NAD  Abdomen: gravid, soft, NT  Ext: NT, no edema    NST:  Indication: cervical dilation    145/mod variability/+ accelerations/no decelerations  [place]: no ctx      Meds:     Current Facility-Administered Medications:   •  prenatal plus vitamin (STUARTNATAL 1+1) 27-1 MG tablet 1 tablet, 1 tablet, Oral, Daily-0800, Verona Moyer D.OSeymour, 1 tablet at 21 1036  •  ondansetron (ZOFRAN) syringe/vial injection 4 mg, 4 mg, Intravenous, Q6HRS PRN, Cesar Landa M.D.        A/P: 20 y.o.  @ 24w4d admitted with suspected cervical incompetene  - dynamic changes on US w/ Dr. Landa; /high on 21   continued inpatient management recommended by Dr. Landa    - SIUP @ 24w4d    NST today as above, reactive per my read; cont qShift NST   S/p BMZ -   S/p mag sulfate for neuroprotection, will re-initiate if signs of labor      - GBS unknown; to send now      dispo: cont antepartum featl surveillance    Janeth Mason MD  [facility], Women's Health

## 2021-03-01 NOTE — PROGRESS NOTES
1900: report received from tramaine arroyo, assumed care of patient.    2000: rn at , assessment done. Vss.  Pt denies needs at this time. efm applied.  Pt denies vaginal bleeding, leaking, contractions, or cramping.  Pt reports fetal movement.  Discuss poc with patient.  Pt verbalizes understanding.      0000: rn at bs, vss. Pt denies needs.     0600: rn to bs, pt sleeping. Vss.    0700: report to rn

## 2021-03-02 LAB — GP B STREP DNA SPEC QL NAA+PROBE: NEGATIVE

## 2021-03-02 PROCEDURE — 59025 FETAL NON-STRESS TEST: CPT | Mod: 26 | Performed by: OBSTETRICS & GYNECOLOGY

## 2021-03-02 PROCEDURE — 302790 HCHG STAT ANTEPARTUM CARE, DAILY

## 2021-03-02 PROCEDURE — 99231 SBSQ HOSP IP/OBS SF/LOW 25: CPT | Mod: 25 | Performed by: OBSTETRICS & GYNECOLOGY

## 2021-03-02 PROCEDURE — 700102 HCHG RX REV CODE 250 W/ 637 OVERRIDE(OP): Performed by: OBSTETRICS & GYNECOLOGY

## 2021-03-02 PROCEDURE — 770002 HCHG ROOM/CARE - OB PRIVATE (112)

## 2021-03-02 PROCEDURE — A9270 NON-COVERED ITEM OR SERVICE: HCPCS | Performed by: OBSTETRICS & GYNECOLOGY

## 2021-03-02 RX ADMIN — PRENATAL WITH FERROUS FUM AND FOLIC ACID 1 TABLET: 3080; 920; 120; 400; 22; 1.84; 3; 20; 10; 1; 12; 200; 27; 25; 2 TABLET ORAL at 14:44

## 2021-03-02 ASSESSMENT — PAIN DESCRIPTION - PAIN TYPE
TYPE: ACUTE PAIN

## 2021-03-02 NOTE — DISCHARGE PLANNING
Discharge Planning Assessment Antepartum    Reason for Referral: Chart Review  Address: [facility] [address] [place] [address]  Phone: [phone]  Mom Diagnosis: Pregnancy-currently 24.5 weeks, due date is 6/17/21, pre-term labor, cervical incompetence  Primary Language: Danish speaking    Father of the Baby: Franklyn Guan  Involved in baby’s care? Yes  Contact Information: [phone]    Prenatal Care: Yes-[facility]  Mom's PCP: None    Support System: FOB and MOB mother    Mom's Insurance: Medicaid FFS  Baby Covered on Insurance: Yes  Mother Employed/School: Not currently  Other children in the home/names & ages: history of a fetal demise at 18 weeks in 2020     Mom's Mental status: alert and oriented  Services used prior to admit: Medicaid    CPS History: No  Psychiatric History: No  Domestic Violence History: No  Drug/ETOH History: No, MOB's UDS was negative on 2/11/21    Ongoing Plan:  will continue to follow and assist as needed.

## 2021-03-02 NOTE — PROGRESS NOTES
S: No complaints.  No contractions or bleeding.  O: Afebrile        EFM: Baseline 150 bpm.  Accelerations present with moderate variability.       No change in physical status.  A: IUP 24 4/7 weeks      Cervical funneling      Hx of previous second trimester loss.      Probable cervical insufficiency, too late for cerclage.  P: Continue with present management.       If contractions begin, consider magnesium sulfate.       Steroids completed.    Time: 15 minutes

## 2021-03-02 NOTE — PROGRESS NOTES
0700- Report received from LEONELA Montero RN. Pt sleeping.  1035- Pt denies LOF, VB, or UC's. Pt reports +FM.  US and toco on.  1105- Pt moved to rm 227 via .  Pt showered.  1900- Pt denies discomfort.  Report given to PAT Spicer RN.

## 2021-03-02 NOTE — PROGRESS NOTES
Report from Becca BARAHONA, care assumed. Pt tearful at beginning of shift regarding visitor policy- states her mother is coming from [place] on Saturday to be with her. Rationale behind visitor policy reviewed with patient and she verbalized understanding, but remains upset that her mother cannot visit if her  is her designated support person. Pt declines ctx, cramping, backache, bleeding, LOF, pelvic pressure. Active FM. FHR AGA. Rare ctx with uterine irritability per toco, pt not feeling. Continue to monitor closely. Report to Becca BARAHONA.

## 2021-03-03 PROCEDURE — 700102 HCHG RX REV CODE 250 W/ 637 OVERRIDE(OP): Performed by: OBSTETRICS & GYNECOLOGY

## 2021-03-03 PROCEDURE — 302790 HCHG STAT ANTEPARTUM CARE, DAILY

## 2021-03-03 PROCEDURE — 770002 HCHG ROOM/CARE - OB PRIVATE (112)

## 2021-03-03 PROCEDURE — 59025 FETAL NON-STRESS TEST: CPT | Mod: 26 | Performed by: OBSTETRICS & GYNECOLOGY

## 2021-03-03 PROCEDURE — 99231 SBSQ HOSP IP/OBS SF/LOW 25: CPT | Mod: 25 | Performed by: OBSTETRICS & GYNECOLOGY

## 2021-03-03 PROCEDURE — A9270 NON-COVERED ITEM OR SERVICE: HCPCS | Performed by: OBSTETRICS & GYNECOLOGY

## 2021-03-03 RX ADMIN — PRENATAL WITH FERROUS FUM AND FOLIC ACID 1 TABLET: 3080; 920; 120; 400; 22; 1.84; 3; 20; 10; 1; 12; 200; 27; 25; 2 TABLET ORAL at 20:18

## 2021-03-03 NOTE — PROGRESS NOTES
Dr. Mancia at BS. POC discussed using  [id number]. Pt to remain inpatient until evaluated by Dr. Landa. Pt denies LOF, vaginal bleeding, abdominal tenderness. States + fetal movement.

## 2021-03-03 NOTE — PROGRESS NOTES
Antepartum Progress Note    Thania Dias is a  at 24w5d  Date of Admission: 2021    Subjective:   Patient seen today. Without complaints. Denies abnormal discharge, no leakage of fluid, and having good fetal movement.   History obtained with language line [id number].     ROS  uterine contractions:no  pain: .no  LOF: no  vaginal Bleeding: no  fetal movement: normal    Objective:   Vitals:    21 0416 21 1050 21 1146 21 1618   BP: 113/68 122/70 127/73 110/57   Pulse: 69 100 (!) 111 (!) 102   Resp: 18 16 16 16   Temp: 37.1 °C (98.7 °F) 36.8 °C (98.3 °F) 36.3 °C (97.3 °F) 36.7 °C (98 °F)   TempSrc: Temporal Temporal Temporal Temporal   SpO2:       Weight:       Height:         Gen: AAO in NAD  Membranes: ruptured: no  Abdomen: gravid, soft, NT  Ext: SCDs on, Nt, no cyanosis or clubbing    NST: 150 / mod bernardino / non reactive with no decelerations  [place]: quiet    Meds:     Current Facility-Administered Medications:   •  prenatal plus vitamin (STUARTNATAL 1+1) 27-1 MG tablet 1 tablet, 1 tablet, Oral, Daily-0800, Verona Moyer D.NADIA, 1 tablet at 21 1444  •  ondansetron (ZOFRAN) syringe/vial injection 4 mg, 4 mg, Intravenous, Q6HRS PRN, Cesar Landa M.D.    Lab:   Recent Results (from the past 72 hour(s))   ACCU-CHEK GLUCOSE    Collection Time: 21  7:00 AM   Result Value Ref Range    Glucose - Accu-Ck 93 65 - 99 mg/dL   ACCU-CHEK GLUCOSE    Collection Time: 21  8:56 AM   Result Value Ref Range    Glucose - Accu-Ck 103 (H) 65 - 99 mg/dL   GRP B STREP, BY PCR (JOSEPH BROTH)    Collection Time: 21  1:20 PM    Specimen: Vaginal; Genital   Result Value Ref Range    Strep Gp B DNA PCR Negative Negative       Assessment:  Thania Dias is a  at 24w5d admitted for cervical incompetence and dynamic changes.     Plan:  # 24w5d IUP   - NST q shift   - s/p steroids course - and Mg for neuroprotection. Will re start if signs of labor  - s/p  indocin for tocolysis  SVE most recently 1cm/80% on 2/28. Would not recheck unless clinical status changes    # GBS: negative on 3/1/2021  # Dispo: cont in patient management as recommended by MFM.

## 2021-03-03 NOTE — PROGRESS NOTES
"0700: Report received from Chelsea BARAHONA. Pt sleeping  0800: Sleeping  0900: Sleeping  1000: Sleeping  1130: Pt is awake. Ipad used to translate. Pt will not look at RN; shaking her foot; appears very agitated. RN showed condolences for her that only her SO is allowed to visit at this time; informed her that L&D manager working on getting the visitor policy that post partum uses that would allow her mother to visit from 1500 to 2100 everyday. Informed her that RN will keep her updated. Pt continues to shake her foot and not look at RN; RN asked if she has UCs/cramping, LOF, or VB; shakes her head no. Instructed her to call RN if anything changes because labor can occur very quickly. RN validated that pt appears agitated and doesn't appear to want RN in the room; asked pt if she would like private time and to avoid being interrupted; pt says \"yes.\" Reassured her the staff will give her space; asked her to call when she is ready for monitoring. CNA updated and asked to give pt space  1550; RN received email regarding visitor policy. L&D now allowing 2 visitors from 8 to 8. Reshma MATAMOROS called to translate for this RN; updated on policy change and get permission to monitor baby  1600; Report given to Michell BARAHONA     "

## 2021-03-03 NOTE — PROGRESS NOTES
1900 - Report received. POC discussed.   2130 - Pt placed on monitor.   2214 - Pt taken off monitor.   0030 - Pt having pain at IV site. IV infiltrated. IV removed.   0700 - Report given. POC discussed.

## 2021-03-04 PROCEDURE — 99231 SBSQ HOSP IP/OBS SF/LOW 25: CPT | Performed by: OBSTETRICS & GYNECOLOGY

## 2021-03-04 PROCEDURE — 770002 HCHG ROOM/CARE - OB PRIVATE (112)

## 2021-03-04 PROCEDURE — 700102 HCHG RX REV CODE 250 W/ 637 OVERRIDE(OP): Performed by: OBSTETRICS & GYNECOLOGY

## 2021-03-04 PROCEDURE — A9270 NON-COVERED ITEM OR SERVICE: HCPCS | Performed by: OBSTETRICS & GYNECOLOGY

## 2021-03-04 PROCEDURE — 302790 HCHG STAT ANTEPARTUM CARE, DAILY

## 2021-03-04 RX ADMIN — PRENATAL WITH FERROUS FUM AND FOLIC ACID 1 TABLET: 3080; 920; 120; 400; 22; 1.84; 3; 20; 10; 1; 12; 200; 27; 25; 2 TABLET ORAL at 08:48

## 2021-03-04 NOTE — PROGRESS NOTES
0700 report rec'd, plan of care discussed. Pt sleeping.   0800 assessment done. Pt reports positive fetal movement, denies any regular/painful UCs.   1200 Denies ucs, none seen on monitor. Pt reports positive fetal movement.   1600 sleeping most of the afternoon, Denies ucs  1900 report to Leana BARAHONA .

## 2021-03-04 NOTE — PROGRESS NOTES
0450: Received report from MAYTE Lockett RN. POC discussed.    0700: Report given to JUN Smith. AVTAR discussed.

## 2021-03-04 NOTE — PROGRESS NOTES
S: No bleeding or leakage of fluid.  O: Afebrile  EFM: Baseline 155 bpm. Accelerations and moderate variability present. Irregular contractions.  A: IUP 24 6/7 weeks      Cervical insufficiency.  P: Using online , extensive discussion why I am not recommending outpatient management at this time.  She is now at a gestational age in which survival is probable but the risk of adverse neurological outcomes is significant.  Reviewed with her the importance of doing what we can do to prolong the pregnancy and outpatient care will require more ambulation and she may have further change to her cervix.  We discussed as an impatient, we are more likely to provide magnesium sulfate for neuroprotection and less likely to have an emergent vaginal breech delivery.    All questions answered to her satisfaction.     services were used in the patient's primary language of Persian.     Name or Number:  Mode of interpretation:  on wheels.    Content of Interpretation:  See above    Time: 25 minutes

## 2021-03-04 NOTE — PROGRESS NOTES
Chasitysalma Dias   24w6d admitted 22      phone used for visit.    Subjective: No contractions, LOF, VB.  + FM.     Objective:   Vitals:    21 0408 21 1046 21 1553 21 1859   BP: 108/58 126/70 123/57 120/57   Pulse: 77 100 (!) 105 81   Resp: 17  17    Temp: 36.6 °C (97.9 °F)  36.7 °C (98.1 °F)    TempSrc: Temporal  Temporal    SpO2:       Weight:       Height:         GENERAL: Alert, in no apparent distress  PSYCHIATRIC: Appropriate affect, intact insight and judgement.  ABDOMEN: Soft, gravid, nontender, nondistended.    EXTREMITIES: No edema, calves NT  SKIN: No rash    NST INTERPRETATION - PER MY READ    INDICATIONS:  labor  UTERINE ACTIVITY: None  BASELINE: 140s  VARIABILITY: moderate  ACCELERATIONS: present  DECELERATIONS: absent  CATEGORY 1 TRACING    Assessment:   21 yo  @ 24w6d admitted for PTL/cervical incompetence  S/P BMZ x 2 on  and   S/P Magnesium for neuroprotection  S/P indocin for tocolysis.  Last cervical exam %  GBS neg  Breech presentation    P.   NST q shift  In house observation per MFM recommendations

## 2021-03-04 NOTE — PROGRESS NOTES
Thania Dias   25w0d  Admission DX: Antepartum abnormality of cervix [O34.40]    Date of Admission: 2021  Patient Active Problem List    Diagnosis Date Noted   • History of fetal demise at 18 weeks 2021       Subjective:   uterine contractions:no  pain: .no  LOF: no  vaginal Bleeding: no  fetal movement: normal    Objective:   Vitals:    21 1859 21 0000 21 0411 21 0742   BP: 120/57 111/65 103/57 109/55   Pulse: 81 78 75 76   Resp: 17 17 17 16   Temp: 36.8 °C (98.3 °F) 36.6 °C (97.8 °F) 36.3 °C (97.4 °F) 36.7 °C (98 °F)   TempSrc: Temporal Temporal Temporal Temporal   SpO2:       Weight:       Height:         Fetal heart variability: moderate, baseline 140, accels present 10x10 criteria, no decels. Reassuring. No uterine activity noted.   Gen: A&Ox3 NAD  Membranes: ruptured: no  Abdomen: gravid, soft, NT  Ext: SCDs on, Nt, no cyanosis or clubbing    Meds:     Current Facility-Administered Medications:   •  prenatal plus vitamin (STUARTNATAL 1+1) 27-1 MG tablet 1 tablet, 1 tablet, Oral, Daily-0800, Verona Moyer, D.OSeymour, 1 tablet at 21 0848  •  ondansetron (ZOFRAN) syringe/vial injection 4 mg, 4 mg, Intravenous, Q6HRS PRN, Cesar Landa M.D.    Labs:    Lab:   Recent Results (from the past 72 hour(s))   GRP B STREP, BY PCR (JOSEPH BROTH)    Collection Time: 21  1:20 PM    Specimen: Vaginal; Genital   Result Value Ref Range    Strep Gp B DNA PCR Negative Negative       Assessment:  19 yo  @ 25w0d admitted for PTL/cervical incompetence  S/P BMZ x 2 on  and   S/P Magnesium for neuroprotection  S/P indocin for tocolysis.  Last cervical exam %  GBS neg  Breech presentation  Plan:  Continue to monitor continuously for uterine activity.   NST q shift.   In house observation per [facility] recommendations.

## 2021-03-04 NOTE — PROGRESS NOTES
1900- Received report from ZEYAD Montes RN. POC discussed all questions answered.     1913-  iPad in use with this RN and Dr. Price. Terrence was interpretor with ID number [id number]. Dr. Price answering pts questions as well as informing pt why staying in the hospital is necessary.     1935- Dr. Price out of room.     1940- Dr. Landa in room. Dr. Landa answering pts questions and discussing with pt reasons why staying in the hospital is necessary.     2005- Dr. Landa out of room.     0450- Report given to LEN Lockett RN. POC discussed, all questions answered.

## 2021-03-05 ENCOUNTER — APPOINTMENT (OUTPATIENT)
Dept: RADIOLOGY | Facility: MEDICAL CENTER | Age: 21
DRG: 832 | End: 2021-03-05
Attending: OBSTETRICS & GYNECOLOGY
Payer: MEDICAID

## 2021-03-05 PROCEDURE — 700102 HCHG RX REV CODE 250 W/ 637 OVERRIDE(OP): Performed by: OBSTETRICS & GYNECOLOGY

## 2021-03-05 PROCEDURE — A9270 NON-COVERED ITEM OR SERVICE: HCPCS | Performed by: OBSTETRICS & GYNECOLOGY

## 2021-03-05 PROCEDURE — 770002 HCHG ROOM/CARE - OB PRIVATE (112)

## 2021-03-05 PROCEDURE — 302790 HCHG STAT ANTEPARTUM CARE, DAILY

## 2021-03-05 RX ADMIN — PRENATAL WITH FERROUS FUM AND FOLIC ACID 1 TABLET: 3080; 920; 120; 400; 22; 1.84; 3; 20; 10; 1; 12; 200; 27; 25; 2 TABLET ORAL at 14:31

## 2021-03-05 NOTE — PROGRESS NOTES
OB Progress Note    Date of Admission: 2021    Subjective:   uterine contractions:no  pain: .no  LOF: no  vaginal Bleeding: no  fetal movement: normal  Feeling well, no concerns today.    Objective:   24hr VS:  Temp:  [36.3 °C (97.4 °F)-37.1 °C (98.7 °F)] 36.7 °C (98.1 °F)  Pulse:  [67-99] 67  Resp:  [16-17] 16  BP: (102-122)/(56-72) 117/60    Gen: AAO, NAD  Abdomen: gravid, soft, NT  Ext: NT, no edema    NST:  Not yet done this AM  [place]: no ctx      Meds:     Current Facility-Administered Medications:   •  prenatal plus vitamin (STUARTNATAL 1+1) 27-1 MG tablet 1 tablet, 1 tablet, Oral, Daily-0800, Verona Moyer, D.O., 1 tablet at 21 0848  •  ondansetron (ZOFRAN) syringe/vial injection 4 mg, 4 mg, Intravenous, Q6HRS PRN, Cesar Landa M.D.      A/P:  20 y.o.  @ 25w1d admitted with suspected cervical incompetence    - dynamic changes on US w/ Dr. Landa; /high on manual check 21              continued inpatient management recommended by Dr. Landa  - SIUP @ 25w1d     NST today as above, reactive per my read; cont qShift NST              S/p BMZ -              S/p mag sulfate for neuroprotection, will re-initiate if signs of labor     - GBS neg 3/1/2021    - fetal monitoring: NST qshift     dispo: cont inpatient management of cervical incompetence    Janeth Mason MD  [facility], Women's Health

## 2021-03-05 NOTE — PROGRESS NOTES
1900: Received report from WILEY Abraham RN. POC discussed.    0700: Report given to LEONELA Neumann RN.

## 2021-03-05 NOTE — PROGRESS NOTES
S: No complaints.  O: Afebrile       Irregular contractions.       EFM:  Moderate variabilty, accelerations present.  A: IUP 25 weeks.      Cervical insufficiency.  P:  Continue with inpatient care.    Time: 15 minutes

## 2021-03-06 PROCEDURE — 59025 FETAL NON-STRESS TEST: CPT | Mod: 26 | Performed by: OBSTETRICS & GYNECOLOGY

## 2021-03-06 PROCEDURE — A9270 NON-COVERED ITEM OR SERVICE: HCPCS | Performed by: OBSTETRICS & GYNECOLOGY

## 2021-03-06 PROCEDURE — 700102 HCHG RX REV CODE 250 W/ 637 OVERRIDE(OP): Performed by: OBSTETRICS & GYNECOLOGY

## 2021-03-06 PROCEDURE — 302790 HCHG STAT ANTEPARTUM CARE, DAILY

## 2021-03-06 PROCEDURE — 76819 FETAL BIOPHYS PROFIL W/O NST: CPT

## 2021-03-06 PROCEDURE — 770002 HCHG ROOM/CARE - OB PRIVATE (112)

## 2021-03-06 PROCEDURE — 99231 SBSQ HOSP IP/OBS SF/LOW 25: CPT | Mod: 25 | Performed by: OBSTETRICS & GYNECOLOGY

## 2021-03-06 RX ADMIN — PRENATAL WITH FERROUS FUM AND FOLIC ACID 1 TABLET: 3080; 920; 120; 400; 22; 1.84; 3; 20; 10; 1; 12; 200; 27; 25; 2 TABLET ORAL at 16:16

## 2021-03-06 ASSESSMENT — PAIN DESCRIPTION - PAIN TYPE: TYPE: ACUTE PAIN

## 2021-03-06 NOTE — PROGRESS NOTES
0700- Report received from JOHNY Lockett RN. Pt sleeping.  0735- Pt denies LOF, VB, or UC's. Pt reports +FM. Pt requests to go back to sleep and eat breakfast when she wakes up.   1113- US and toco on.  1800- Pt has no complaints of cramping throughout the day. Pt's mother flew in from [place] today and is at the bedside visiting.  1900- Report given to LEONELA Montero RN.

## 2021-03-06 NOTE — PROGRESS NOTES
1900: Received report from LEONELA Neumann RN.    2240: Dr. Driscoll updated, FHT tracing reviewed. Received orders for BPP.    2315: Pt updated, verbalizes understanding.    0122: Dr. Driscoll updated on Biophysical results. Notified of VD's x2. Per MD pt ok to take off monitor. Pt denies UC. EFM and TOCO removed at 0126.    0700: Report given to ELMER Askew RN.

## 2021-03-06 NOTE — PROGRESS NOTES
Pt reports positive fetal movement, denies vaginal bleeding, LOF, reports positive Fetal movement.

## 2021-03-07 PROCEDURE — 700102 HCHG RX REV CODE 250 W/ 637 OVERRIDE(OP): Performed by: OBSTETRICS & GYNECOLOGY

## 2021-03-07 PROCEDURE — A9270 NON-COVERED ITEM OR SERVICE: HCPCS | Performed by: OBSTETRICS & GYNECOLOGY

## 2021-03-07 PROCEDURE — 770002 HCHG ROOM/CARE - OB PRIVATE (112)

## 2021-03-07 PROCEDURE — 99231 SBSQ HOSP IP/OBS SF/LOW 25: CPT | Performed by: OBSTETRICS & GYNECOLOGY

## 2021-03-07 PROCEDURE — 302790 HCHG STAT ANTEPARTUM CARE, DAILY

## 2021-03-07 RX ADMIN — PRENATAL WITH FERROUS FUM AND FOLIC ACID 1 TABLET: 3080; 920; 120; 400; 22; 1.84; 3; 20; 10; 1; 12; 200; 27; 25; 2 TABLET ORAL at 08:00

## 2021-03-07 NOTE — PROGRESS NOTES
Report from Becca BARAHONA, care assumed. Pt able to visit with mother and FOB this evening and appeared in good spirits after visit. VSS. Afebrile. Declines contractions, cramping, backache, pelvic pressure, bleeding, LOF. Active FM. FHR AGA. No ctx per toco. Report to Aleyda BARAHONA.

## 2021-03-07 NOTE — PROGRESS NOTES
Thania Plummer Arun   25w3d  Admission DX: Antepartum abnormality of cervix [O34.40]  Admitted for cervix abnormality    Date of Admission: 2021  Patient Active Problem List    Diagnosis Date Noted   • History of fetal demise at 18 weeks 2021       Subjective: Has no complaints, is not feeling back pain, cramping, abdominal pain, leakage of fluid, or any other concerns.  uterine contractions:no  pain: .no  LOF: no  vaginal Bleeding: no  fetal movement: normal    Objective:   Vitals:    21 0015 21 0400 21 0943   BP: 130/63 120/60 118/70 118/64   Pulse: 85 73 90 82   Resp:    18   Temp: 36.6 °C (97.8 °F) 36.4 °C (97.5 °F) 36.4 °C (97.6 °F) 36.9 °C (98.4 °F)   TempSrc: Temporal Temporal Temporal Temporal   SpO2:       Weight:       Height:         Fetal heart variability: moderate  Fetal Heart Rate decelerations: none  Fetal Heart Rate accelerations: yes  Baseline FHR: 150 per minute  Fetal Non-stress Test: reactive  Uterine irritability: no  Gen: NAD, resting comfortably   Abdomen: gravid, soft, NT  Ext: SCDs on, Nt, no cyanosis or clubbing    Meds:     Current Facility-Administered Medications:   •  prenatal plus vitamin (STUARTNATAL 1+1) 27-1 MG tablet 1 tablet, 1 tablet, Oral, Daily-0800, Verona Moyer DNY, 1 tablet at 21 1616  •  ondansetron (ZOFRAN) syringe/vial injection 4 mg, 4 mg, Intravenous, Q6HRS PRN, Cesar Landa M.D.    Lab: No results found for this or any previous visit (from the past 72 hour(s)).    A/P:  20 y.o.  @ 25w3d with  labor versus cervical incompetence  Antepartum abnormality of cervix [O34.40]  -Continue inpatient monitoring  -Due to early gestational age and lack of measurable cervix with dynamic cervix seen on ultrasound, need to continue close observation as patient could deliver eminently in a very fast fashion with a very  .  -As the patient progresses in gestational age, will consider reevaluating  the inpatient necessity  - fetal monitoring: Every shift  - GBS negative    dispo: Inpatient as recommended by maternal-fetal medicine, will consider inpatient necessity after the patient is older than 28 weeks gestational age    Verona Moyer D.O.  [facility], Women's Health

## 2021-03-07 NOTE — PROGRESS NOTES
Antepartum Progress Note    Thania Dias is a  at 25w2d   Date of Admission: 2021    Subjective:   Patient seen today with nurse . States having no pain or contractions. Feeling good fetal movement. Asking about plan of care for the future as she has been feeling good for many days.    ROS  uterine contractions:no  pain: .no  LOF: no  vaginal Bleeding: no  fetal movement: normal    Objective:   Vitals:    21 0353 21 0734 21 1122 21 1508   BP: 122/63 (!) 99/55 117/57 126/66   Pulse: 81 81 75 75   Resp: 18 17 17 16   Temp: 37.1 °C (98.8 °F) 36.6 °C (97.9 °F) 36.4 °C (97.5 °F) 36.6 °C (97.9 °F)   TempSrc: Temporal Temporal Temporal Temporal   SpO2:       Weight:       Height:           Gen: AAO in NAD  Membranes: ruptured: no  Abdomen: gravid, soft, NT  Ext: SCDs on, Nt, no cyanosis or clubbing    NST: 140 / moderate variability / positive 10x10 accelerations with       Assessment:  Thania Dias is a  at 25w2d admitted for cervical incompetence.    Plan:  # Advanced cervical dilation  - 1/80/high on manual check from 2021 with dynamic changes on ultrasound by MFM.   - continued in patient management until at least 28 weeks recommended by MFM.    #SIUP at 25w2d   - s/p beta methasone and Mg for fetal neuroprotection on  and . Would reinitiate for signs of labor.   - NST as above. Cont NST q shift    #Hospital issues  - cont regular diet  - ambulate prn with bathroom priv    # GBS: negative 3/1/2021  # T&S: A+  #Disp: in patient management as indicated by MFM.

## 2021-03-08 PROCEDURE — 302790 HCHG STAT ANTEPARTUM CARE, DAILY

## 2021-03-08 PROCEDURE — 99231 SBSQ HOSP IP/OBS SF/LOW 25: CPT | Performed by: OBSTETRICS & GYNECOLOGY

## 2021-03-08 PROCEDURE — 700102 HCHG RX REV CODE 250 W/ 637 OVERRIDE(OP): Performed by: OBSTETRICS & GYNECOLOGY

## 2021-03-08 PROCEDURE — A9270 NON-COVERED ITEM OR SERVICE: HCPCS | Performed by: OBSTETRICS & GYNECOLOGY

## 2021-03-08 PROCEDURE — 770002 HCHG ROOM/CARE - OB PRIVATE (112)

## 2021-03-08 RX ADMIN — PRENATAL WITH FERROUS FUM AND FOLIC ACID 1 TABLET: 3080; 920; 120; 400; 22; 1.84; 3; 20; 10; 1; 12; 200; 27; 25; 2 TABLET ORAL at 11:59

## 2021-03-08 NOTE — CARE PLAN
Problem: Powerlessness  Goal: Patient will express individual needs/desires  Outcome: PROGRESSING AS EXPECTED  Note: Pt expressed desire to sleep, RN facilitated sleeping longer to reduce feelings of powerlessness.     Problem: Fatigue  Goal: Patient will use techniques to conserve energy  Outcome: PROGRESSING AS EXPECTED  Intervention: Plan care to limit interruptions  Note: Care clustered to reduce interruptions to promote rest and combat fatigue.

## 2021-03-08 NOTE — PROGRESS NOTES
1900: Report received from Luis BARAHONA. POC discussed. Pt mother at bedside.     1945: EFM/[place] applied. Pt denies contractions or pain. Pt reports good FM. Denies LOF or vaginal bleeding. Pt denies further needs at this time.     0600: Pt sleeping through the night, per pt no concerns present at this time.     0700: Report to Haleigh BARAHONA. POC discussed.

## 2021-03-08 NOTE — PROGRESS NOTES
0700- Report received. POC discussed. Pt sleeping at this time.     1030- Monitors placed x2. Pt denies cramping bleeding or leaking. No needs at this time.     1640- Pt mother at bedside. Pt denies any needs at this time.

## 2021-03-08 NOTE — PROGRESS NOTES
0700- Report received from NADIA Salguero RN. Pt sleeping.  0930- Pt sleeping.  1200- Pt visiting with FOB and her mother. Pt denies LOF, VB, or UC's. Pt reports +FM.  Pt sitting up eating lunch.  1220- Dr. Pat at the bedside, POC discussed.  1245- US and toco on, reactive tracing obtained.  1800- Pt denies discomfort throughout the entire day.  1900- Report given to JOHNY Lockett RN.

## 2021-03-09 PROCEDURE — 700102 HCHG RX REV CODE 250 W/ 637 OVERRIDE(OP): Performed by: OBSTETRICS & GYNECOLOGY

## 2021-03-09 PROCEDURE — 99231 SBSQ HOSP IP/OBS SF/LOW 25: CPT | Performed by: OBSTETRICS & GYNECOLOGY

## 2021-03-09 PROCEDURE — A9270 NON-COVERED ITEM OR SERVICE: HCPCS | Performed by: OBSTETRICS & GYNECOLOGY

## 2021-03-09 PROCEDURE — 302790 HCHG STAT ANTEPARTUM CARE, DAILY

## 2021-03-09 PROCEDURE — 770002 HCHG ROOM/CARE - OB PRIVATE (112)

## 2021-03-09 RX ADMIN — PRENATAL WITH FERROUS FUM AND FOLIC ACID 1 TABLET: 3080; 920; 120; 400; 22; 1.84; 3; 20; 10; 1; 12; 200; 27; 25; 2 TABLET ORAL at 08:00

## 2021-03-09 ASSESSMENT — PATIENT HEALTH QUESTIONNAIRE - PHQ9
1. LITTLE INTEREST OR PLEASURE IN DOING THINGS: NOT AT ALL
SUM OF ALL RESPONSES TO PHQ9 QUESTIONS 1 AND 2: 0

## 2021-03-09 NOTE — PROGRESS NOTES
0700- Report received from LEN Dobbins RN. POC discussed.     0950- Dr. Taylor at bedside. US used and fetal position is vertex. Pt denies cramping bleeding or leaking.     No complaints from pt throughout the day. Denies any needs.    1900- Report to NOC shift RN.

## 2021-03-09 NOTE — PROGRESS NOTES
Thania Plummer Arun   25w5d  Admission DX: Antepartum abnormality of cervix [O34.40]    Date of Admission: 2021  Patient Active Problem List    Diagnosis Date Noted   • History of fetal demise at 18 weeks 2021     Subjective:  Patient reports she is feeling fine this AM.  Hasn't felt any contractions, no vaginal bleeding, no LOF.  Endorses FM.    Objective:   Vitals:    21 1620 21 1917 21 2354 21 0438   BP: 122/59 117/56 108/56 (!) 86/51   Pulse: 91 88 75 66   Resp: 16 16 16 17   Temp: 36.2 °C (97.1 °F) 36.9 °C (98.4 °F) 36.6 °C (97.8 °F) 36.2 °C (97.2 °F)   TempSrc: Temporal Temporal Temporal Temporal   SpO2:       Weight:       Height:         Gen: NAD  Membranes: ruptured: no  Abdomen: gravid, soft, NT  Ext: SCDs on, Nt, no cyanosis or clubbing    Meds:     Current Facility-Administered Medications:   •  prenatal plus vitamin (STUARTNATAL 1+1) 27-1 MG tablet 1 tablet, 1 tablet, Oral, Daily-0800, Verona Moyer D.O., 1 tablet at 21 1159  •  ondansetron (ZOFRAN) syringe/vial injection 4 mg, 4 mg, Intravenous, Q6HRS PRN, Cesar Landa M.D.    Labs:    Lab: No results found for this or any previous visit (from the past 72 hour(s)).        Assessment/Plan:  20 y.o.  @ 25w5d with pregnancy complicated by concern for PTL vs cervical incompetence.  #PTL vs cervical incompetence.  Due to early GA with no measurable cervix and dynamic changes plan is for inpatient care.  GBS negative 3/1.  T&S A+  --continue inpatient monitoring, appreciate [facility] recs  #Fetal wellbeing.  S/p BMZ .    --give rescue course if progresses in labor  --US tody reveals vertex position   --NST q shift  --if progresses will initiate magnesium sulfate  # Hospital issues.  -- PIV  -- Regular diet  -- SCDs, ambulation  -- spontaneous voiding    DO Geovany AdamsBarix [facility], Women[facility]

## 2021-03-09 NOTE — PROGRESS NOTES
S: No complaints.  O: Afebrile       No contractions during monitoring.       EFM:  Moderate variabilty, accelerations present.Baseline 155.  A: IUP 25 5/7 weekweeks.      Cervical insufficiency.      Suspect previously seen contractions may have been secondary to cervical dilation but difficult to determin.  P:  Continue with inpatient care.     Time: 15 minutes

## 2021-03-09 NOTE — PROGRESS NOTES
1900: Received report from ELMER Askew RN. POC discussed.    0700: Report given to ELMER Massey RN.

## 2021-03-09 NOTE — PROGRESS NOTES
Progress Note  20 y.o. , SS female , admitted for monitoring with shortening of cervix, and hx of Pre Term Delivery with demise   Subjective: Denies contractions , Leaking , or excessive discharge   Patient noted to have Pre term cervical shortening and felt to be secondary to Pre Term Labor , not incompetence   With reported exam at /high / T-lie   Patient has been stable x 2 weeks , and plan for continued observation , until clinically viable   Objective Data:            Vitals:    21 1905 21 0005 21 0415 21 1159   BP: 115/65 122/59 (!) 89/53 134/72   Pulse: 79 69 (!) 56 94   Resp:    16   Temp: 36.7 °C (98 °F) 36.7 °C (98.1 °F) 36.7 °C (98.1 °F) 36.6 °C (97.8 °F)   TempSrc: Temporal Temporal Temporal Temporal   SpO2:       Weight:       Height:           No intake or output data in the 24 hours ending 21 1603    Current Facility-Administered Medications   Medication Dose Route Frequency Provider Last Rate Last Admin   • prenatal plus vitamin (STUARTNATAL 1+1) 27-1 MG tablet 1 tablet  1 tablet Oral Daily-0800 Verona Moyer D.O.   1 tablet at 21 1159   • ondansetron (ZOFRAN) syringe/vial injection 4 mg  4 mg Intravenous Q6HRS PRN Cesar Landa M.D.           Assessment: 25w4d                         Advanced cervical shortening ,                          No evidence for progression at this time   Evaluation and clinical decision making , including analysis of Fetal data and maternal lab work completed over a 30 minutes period.       Plan: CPM            Follow up with scan Q 72 hrs ,            Plan to keep hospitalized until 28 weeks

## 2021-03-10 PROCEDURE — 770002 HCHG ROOM/CARE - OB PRIVATE (112)

## 2021-03-10 PROCEDURE — A9270 NON-COVERED ITEM OR SERVICE: HCPCS | Performed by: OBSTETRICS & GYNECOLOGY

## 2021-03-10 PROCEDURE — 302790 HCHG STAT ANTEPARTUM CARE, DAILY

## 2021-03-10 PROCEDURE — 700102 HCHG RX REV CODE 250 W/ 637 OVERRIDE(OP): Performed by: OBSTETRICS & GYNECOLOGY

## 2021-03-10 PROCEDURE — 99233 SBSQ HOSP IP/OBS HIGH 50: CPT | Performed by: OBSTETRICS & GYNECOLOGY

## 2021-03-10 RX ADMIN — PRENATAL WITH FERROUS FUM AND FOLIC ACID 1 TABLET: 3080; 920; 120; 400; 22; 1.84; 3; 20; 10; 1; 12; 200; 27; 25; 2 TABLET ORAL at 07:58

## 2021-03-10 ASSESSMENT — PAIN DESCRIPTION - PAIN TYPE
TYPE: ACUTE PAIN
TYPE: ACUTE PAIN

## 2021-03-10 NOTE — PROGRESS NOTES
1900: Received report form ELMER Massey RN. POC discussed.    0700: Report given to PAT Willis RN.

## 2021-03-10 NOTE — PROGRESS NOTES
S: No complaints.  Reports fetal movement.  No contractions, bleeding or pressure.  O: Afebrile        EFM: Baseline: 150 bpm. Moderate variability and accelerations present.         Irregular contractions.  A: IUP 25 6/7 weeks       Cervical insufficiency  P:  Continue with inpatient care.     Time: 15 minutes.

## 2021-03-10 NOTE — PROGRESS NOTES
Antepartum Progress Note    Thania Dias     IUP@25w6d  Cervical insufficiency      Subjective:  Patient is resting in bed without complaints.  She has been complaining about her stay in the hospital and has been threatening to leave AMA.  I saw and evaluated patient with Maltese-speaking nurse.  She is currently asymptomatic with reports of normal fetal movements.  Denies any bleeding or leaking.  Denies any pelvic pressure contractions or cramping.  Denies headaches or scotoma.    Objective:   Vitals:    03/09/21 1920 03/09/21 2350 03/10/21 0405 03/10/21 0758   BP: (!) 96/51 117/56 118/59 102/53   Pulse: 79 64 83 68   Resp:       Temp: 37.2 °C (98.9 °F) 36.6 °C (97.8 °F) 36.4 °C (97.6 °F) 37 °C (98.6 °F)   TempSrc: Temporal Temporal Temporal Temporal   SpO2:       Weight:       Height:         Physical exam:  Patient is awake alert oriented x3 and appears to be in no distress  HEENT normocephalic atraumatic  Neck is supple   Chest is Clear to auscultation bilaterally  CVS: Regular rate and rhythm.  No murmurs rubs or gallops  Abdomen is soft gravid and nontender  Extremity: No clubbing cyanosis or edema, no calf pain or Homans signs.  Patient had removed SCDs.  Pelvic exam deferred  Skin is without rashes or lesions  Neurologic exam: No gross deficits  Psych: Appropriate mood and affect    Meds:     Current Facility-Administered Medications:   •  prenatal plus vitamin (STUARTNATAL 1+1) 27-1 MG tablet 1 tablet, 1 tablet, Oral, Daily-0800, Verona Moyer D.NADIA, 1 tablet at 03/10/21 0758  •  ondansetron (ZOFRAN) syringe/vial injection 4 mg, 4 mg, Intravenous, Q6HRS PRN, Cesar Landa M.D.  Labs:  No results found for this or any previous visit (from the past 24 hour(s)).    Assessment:   IUP@ 25w6d  Labor State: Antepartum  Patient was admitted with cervical insufficiency-patient is currently stable  NST on previous shift is reactive  Patient is status post betamethasone x2 doses    Plan:  NST to be  done this morning-patient wants to wait for a few hours for monitoring  Pregnancy precautions and plan of care reviewed  Continue pelvic and bedrest  I discussed plan of care with patient.  I discussed her high risk of having a premature delivery.  Perinatology recommends patient to be observed in hospital until 28 weeks gestation due to her high risks for  delivery.  I discussed this in detail with patient and at this point she desires to stay in the hospital.  30 minutes spent for face-to-face counseling today      Rojas Montalvo M.D.

## 2021-03-10 NOTE — PROGRESS NOTES
0900- Pt frustrated with her stay in the hospital and is wondering why she cant go home and be on bedrest. RN to bedside to help explain the reasons why Dr Landa still wants her to be inpatient with the help of RN ZEYAD Quinones, who translated. Pt still is very frustrated. Reassured her that we will have Dr Landa come in and explain his rationale more when he comes on the unit today.

## 2021-03-11 PROCEDURE — 770002 HCHG ROOM/CARE - OB PRIVATE (112)

## 2021-03-11 PROCEDURE — 700102 HCHG RX REV CODE 250 W/ 637 OVERRIDE(OP): Performed by: OBSTETRICS & GYNECOLOGY

## 2021-03-11 PROCEDURE — A9270 NON-COVERED ITEM OR SERVICE: HCPCS | Performed by: OBSTETRICS & GYNECOLOGY

## 2021-03-11 PROCEDURE — 59025 FETAL NON-STRESS TEST: CPT | Mod: 26 | Performed by: OBSTETRICS & GYNECOLOGY

## 2021-03-11 PROCEDURE — 99232 SBSQ HOSP IP/OBS MODERATE 35: CPT | Mod: 25 | Performed by: OBSTETRICS & GYNECOLOGY

## 2021-03-11 RX ADMIN — PRENATAL WITH FERROUS FUM AND FOLIC ACID 1 TABLET: 3080; 920; 120; 400; 22; 1.84; 3; 20; 10; 1; 12; 200; 27; 25; 2 TABLET ORAL at 09:23

## 2021-03-11 ASSESSMENT — PATIENT HEALTH QUESTIONNAIRE - PHQ9
2. FEELING DOWN, DEPRESSED, IRRITABLE, OR HOPELESS: NOT AT ALL
1. LITTLE INTEREST OR PLEASURE IN DOING THINGS: NOT AT ALL
SUM OF ALL RESPONSES TO PHQ9 QUESTIONS 1 AND 2: 0

## 2021-03-11 NOTE — PROGRESS NOTES
0700- report received from JOHNY Lockett RN. Plan of care discussed.     0800- patient sleeping soundly. Patient not disturbed.     0915- Dr Moyer at bedside with  IPAD. Dr Moyer going over plan of care with patient. Patient states she is depressed. Patient refusing everything offered regarding medication, color books, blinds up., or possible wheelchair ride. Patient is to discuss with FOB of possible things to help.     1600- fetal monitoring tracing reviewed by Dr Moyer. Okay for patient to come off monitors. EFM and TOCO removed     1900- report given to JOHNY Lockett RN

## 2021-03-11 NOTE — PROGRESS NOTES
S: Patient asking why she cannot be at rest at home.  O: EFM: irregular contractions, Baseline 150 bpm. Moderate variability and accelerations present.  A:  IUP 25 6/7 week        Cervical insufficiency with cervical funneling.  P:   services were used in the patient's primary language of Azeri.     Name or Number:   Mode of interpretation: Language line    Content of Interpretation:  Reviewed with her that based on her history and her current situation, she has cervical insufficiency.  Ideally she would have benefited from a cerclage and should be consider as a candidate for cerclage in any future pregnancy.  She appeared late in care and explained when the cervix begins to shorten and/or funneled, there is a high probability of becoming infected and/or having PPROM or  birth.       Unfortunately, home care is less than ideal as patients need to be more ambulatory and most patients will not remain at rest as well as is done in the hospital.      Discussed plan is to reassess her situation at 28 weeks and only if stable with increased ambulation.  This is assuming she does not deliver or have other complications. For now, we are trying to prolong the pregnancy as best as possible away from extreme prematurity.    Time: 30 minutes.

## 2021-03-11 NOTE — PROGRESS NOTES
1300 - Assumed care of pt, report received from Latricia BARAHONA.   1615 - RN into room with Reva Quinones RN to translate. Pt agreeable to 20 min of monitoring. Monitors placed. Pt denies any UCs or discomfort.   1900 - Report given to Leana BARAHONA.

## 2021-03-12 PROCEDURE — 700102 HCHG RX REV CODE 250 W/ 637 OVERRIDE(OP): Performed by: OBSTETRICS & GYNECOLOGY

## 2021-03-12 PROCEDURE — 302790 HCHG STAT ANTEPARTUM CARE, DAILY

## 2021-03-12 PROCEDURE — 59025 FETAL NON-STRESS TEST: CPT | Mod: 26 | Performed by: OBSTETRICS & GYNECOLOGY

## 2021-03-12 PROCEDURE — 99231 SBSQ HOSP IP/OBS SF/LOW 25: CPT | Mod: 25 | Performed by: OBSTETRICS & GYNECOLOGY

## 2021-03-12 PROCEDURE — A9270 NON-COVERED ITEM OR SERVICE: HCPCS | Performed by: OBSTETRICS & GYNECOLOGY

## 2021-03-12 PROCEDURE — 770002 HCHG ROOM/CARE - OB PRIVATE (112)

## 2021-03-12 RX ADMIN — PRENATAL WITH FERROUS FUM AND FOLIC ACID 1 TABLET: 3080; 920; 120; 400; 22; 1.84; 3; 20; 10; 1; 12; 200; 27; 25; 2 TABLET ORAL at 11:07

## 2021-03-12 NOTE — CARE PLAN
Problem: Infection  Goal: Will remain free from infection  Outcome: PROGRESSING AS EXPECTED  Note: Patient remains afebrile. No S&S of infections       Problem: Pain  Goal: Alleviation of Pain or a reduction in pain to the patient's comfort goal  Outcome: PROGRESSING AS EXPECTED

## 2021-03-12 NOTE — PROGRESS NOTES
0700- report received from JOHNY Lockett RN. Plan of care discussed. Patient sleeping soundly. Patient not disturbed.     0830- patient sleeping soundly. Patient not disturbed    0915- patient sleeping soundly. Patient not disturbed.     1105- assessment done. Patient denies any contractions, leaking of fluid or any vaginal bleeding. States positive fetal movement.     1900- report given to KEV Acharya RN

## 2021-03-12 NOTE — PROGRESS NOTES
Thania Plummer Arun   26w0d  Admission DX: Antepartum abnormality of cervix [O34.40]  Admitted for cervical insufficiency    Date of Admission: 2021  Patient Active Problem List    Diagnosis Date Noted   • History of fetal demise at 18 weeks 2021       Subjective: pt is withdrawn and challenging to talk to this morning. Asked if she was depressed and she started crying and said yes.  Dulce Maria is only able to come fri-Sun to visit due to work.  Offered outside visiting hours for him and she declined.  Asked her if a coloring book would help, she said no she colors on her phone.  Asked if she would like pastoral services, answer was no.  Asked if she feels medication is necessary, again no.  When asking what brings her vanita, she cried and stated seeing her family.  Her family does not live in [place].  She has no thoughts of what would help her situation.      uterine contractions:no  pain: .no  LOF: no  vaginal Bleeding: no  fetal movement: normal    Objective:   Vitals:    21 0918 21 1202 21 1557 21   BP: 108/55 124/64 128/61 120/59   Pulse: 73 86 82 76   Resp: 16 17 17    Temp: 37 °C (98.6 °F) 36.4 °C (97.6 °F) 36.9 °C (98.5 °F) 36.9 °C (98.4 °F)   TempSrc: Temporal Temporal Temporal Temporal   SpO2:       Weight:       Height:         Cat I reactive NST  Gen: Depressed, tearful  Abdomen: gravid, soft, NT  Ext: SCDs on, Nt, no cyanosis or clubbing    Meds:     Current Facility-Administered Medications:   •  prenatal plus vitamin (STUARTNATAL 1+1) 27-1 MG tablet 1 tablet, 1 tablet, Oral, Daily-0800, Verona Moyer D.O., 1 tablet at 21 0923  •  ondansetron (ZOFRAN) syringe/vial injection 4 mg, 4 mg, Intravenous, Q6HRS PRN, Cesar Landa M.D.    Lab: No results found for this or any previous visit (from the past 72 hour(s)).    A/P:  20 y.o.  @ 26w0d with cervical insufficiency  Antepartum abnormality of cervix [O34.40]  - Depression: all options were provided  to the patient which she declined  -  ordered to evaluate patient; I have great concerns about her level of depression and its adverse effects on the growing fetus  - Explained to pt the risks of depression on pregnancy and it is counteractive to what we are tying to accomplish with keeping her inpatient to prolong the pregnancy, will wait for SS consult for any additional help  - fetal monitoring: qshift  - GBSnegative    dispo: in pt until 28 weeks then reevaluate    Verona Moyer D.O.  [facility], Women's Health

## 2021-03-12 NOTE — PROGRESS NOTES
Indication for NST: cervical insufficiency    NST performed and per my read:    Baseline 150, mod bernardino, +accels, +decel to 120 lasting 2 mins return to baseline mod variability the whole time  TOCO monitor: no CTXs noted    Reassuring other than small decel.  Will continue qshift monitoring for now.     Verona Moyer D.O.

## 2021-03-12 NOTE — PROGRESS NOTES
1900: Received report from LEONELA Owens RN. POC discussed.    2257: Dr. Landa notified of PD on FHT. Will notify Dr. Moyer as well.    2301: Dr. Moyer notified. RN requested MD to review tracing. MD reviewed tracing. 2303: MD reviewed FHT. Per MD no continuous monitoring at this time.    0700: Report given to LEONELA Owens RN.

## 2021-03-13 PROCEDURE — 302790 HCHG STAT ANTEPARTUM CARE, DAILY

## 2021-03-13 PROCEDURE — A9270 NON-COVERED ITEM OR SERVICE: HCPCS | Performed by: OBSTETRICS & GYNECOLOGY

## 2021-03-13 PROCEDURE — 770002 HCHG ROOM/CARE - OB PRIVATE (112)

## 2021-03-13 PROCEDURE — 700102 HCHG RX REV CODE 250 W/ 637 OVERRIDE(OP): Performed by: OBSTETRICS & GYNECOLOGY

## 2021-03-13 RX ADMIN — PRENATAL WITH FERROUS FUM AND FOLIC ACID 1 TABLET: 3080; 920; 120; 400; 22; 1.84; 3; 20; 10; 1; 12; 200; 27; 25; 2 TABLET ORAL at 13:52

## 2021-03-13 NOTE — PROGRESS NOTES
Thania Dias   26w1d admitted 21 for cervical insufficiency    Subjective:No contractions, LOF, VB.  Fetus active.       Objective:   Vitals:    21 0005 21 1105 21 1533 21 1910   BP: 120/61 127/63 125/62 116/58   Pulse: 72 (!) 101 88 91   Resp:  16  17   Temp: 36.9 °C (98.4 °F) 37 °C (98.6 °F) 36.9 °C (98.4 °F) 37.2 °C (98.9 °F)   TempSrc: Temporal Temporal Temporal Temporal   SpO2:       Weight:       Height:         GENERAL: Alert, in no apparent distress  PSYCHIATRIC: Appropriate affect, intact insight and judgement.  ABDOMEN: Soft, gravid, nontender, nondistended.    EXTREMITIES: No edema, calves NT  SKIN: No rash    NST INTERPRETATION - PER MY READ    INDICATIONS: cervical insufficiency/ptl  UTERINE ACTIVITY: rare ctx with irritability  BASELINE: 150s  VARIABILITY: moderate  ACCELERATIONS: present  DECELERATIONS: occasional variables  Reactive NST, reassuring for gestational age.       Assessment:   19 yo  @ 26w1d admitted for cervical insufficiency/PTL  S/P BMZ x 2 and Mag on  and   GBS neg    P.   Continue inpatient management as per MFM  Q shift NST

## 2021-03-13 NOTE — DISCHARGE PLANNING
:    Received consult to see patient for depression related to prolonged hospitalization.  SW discussed with RN and felt that a Psychiatry consult would be beneficial for patient.  DENVER sent a Voalte message to Dr. Pat asking for a Psychiatry consult to be ordered.  Dr. Pat replied by saying he would place an order.  Updated RN.

## 2021-03-13 NOTE — PROGRESS NOTES
0700- Report received from KEV Acharya RN. Pt sleeping.  1115- Pt sleeping.  1235- pt just finishing eating breakfast tray. Pt denies LOF, VB, or UC's. Pt reports +FM.  Pt requesting to wait 1 hr for FHT monitoring.  1330- US and toco on, reactive tracing obtained.  1900- Report given to MAYTE Lockett RN.

## 2021-03-13 NOTE — CARE PLAN
Problem: Safety  Goal: Free from accidental injury  Outcome: PROGRESSING AS EXPECTED  Note: Bed in locked and low position. Side rails up x2. Call light within reach. Educated on calling for assistance.       Problem: Knowledge Deficit  Goal: Patient/Support Person demonstrates understanding regarding condition, prognosis and treatment needs during the pregnancy  Outcome: PROGRESSING AS EXPECTED  Note: Ongoing discussion of POC. Patient and family encourages to state needs.

## 2021-03-14 LAB — GLUCOSE 1H P 50 G GLC PO SERPL-MCNC: 105 MG/DL (ref 70–139)

## 2021-03-14 PROCEDURE — 59025 FETAL NON-STRESS TEST: CPT | Mod: 26 | Performed by: OBSTETRICS & GYNECOLOGY

## 2021-03-14 PROCEDURE — 770002 HCHG ROOM/CARE - OB PRIVATE (112)

## 2021-03-14 PROCEDURE — 99231 SBSQ HOSP IP/OBS SF/LOW 25: CPT | Mod: 25 | Performed by: OBSTETRICS & GYNECOLOGY

## 2021-03-14 PROCEDURE — 302790 HCHG STAT ANTEPARTUM CARE, DAILY

## 2021-03-14 PROCEDURE — 36415 COLL VENOUS BLD VENIPUNCTURE: CPT

## 2021-03-14 PROCEDURE — 82950 GLUCOSE TEST: CPT

## 2021-03-14 NOTE — PROGRESS NOTES
ANTEPARTUM PROGRESS NOTE;    Thania Dias is a 20 y.o. female  at 26w3d.  Patient denies a history of contractions leakage of fluid vaginal bleeding.  Having good fetal movement    Patient Active Problem List    Diagnosis Date Noted   • History of fetal demise at 18 weeks 2021       Review of systems; denies vaginal bleeding, leakage of fluid, uterine contractions, fever chills or abdominal pain  History reviewed. No pertinent past medical history.  Past Surgical History:   Procedure Laterality Date   • OPEN REDUCTION      (L) wrist radius 2018     Asa [aspirin]  Social History     Socioeconomic History   • Marital status: Single     Spouse name: Not on file   • Number of children: Not on file   • Years of education: Not on file   • Highest education level: Not on file   Occupational History   • Not on file   Tobacco Use   • Smoking status: Never Smoker   • Smokeless tobacco: Never Used   Substance and Sexual Activity   • Alcohol use: Not Currently   • Drug use: Not Currently     Types: Marijuana, Inhaled     Comment: 2020   • Sexual activity: Not Currently     Partners: Male     Comment: Pregnancy Planned and Desired    Other Topics Concern   • Not on file   Social History Narrative   • Not on file     Social Determinants of Health     Financial Resource Strain:    • Difficulty of Paying Living Expenses:    Food Insecurity:    • Worried About Running Out of Food in the Last Year:    • Ran Out of Food in the Last Year:    Transportation Needs:    • Lack of Transportation (Medical):    • Lack of Transportation (Non-Medical):    Physical Activity:    • Days of Exercise per Week:    • Minutes of Exercise per Session:    Stress:    • Feeling of Stress :    Social Connections:    • Frequency of Communication with Friends and Family:    • Frequency of Social Gatherings with Friends and Family:    • Attends Church Services:    • Active Member of Clubs or Organizations:    • Attends Club or  "Organization Meetings:    • Marital Status:    Intimate Partner Violence:    • Fear of Current or Ex-Partner:    • Emotionally Abused:    • Physically Abused:    • Sexually Abused:          Physical examination;  Alert and oriented x3  Gen.-well-developed well-nourished female in no apparent distress  HEENT-normocephalic, nontraumatic,EOMI,PERRLA  /54   Pulse 67   Temp 36.9 °C (98.4 °F) (Temporal)   Resp 16   Ht 1.702 m (5' 7\")   Wt 94.6 kg (208 lb 8.9 oz)   LMP 09/10/2020   SpO2 96%   Breastfeeding No   BMI 32.66 kg/m²   Skin is warm and dry  Back-negative for CVA tenderness  Cardiovascular-regular rate and rhythm, normal S1-S2 no murmurs gallops  Lungs-clear to auscultation bilaterally  Abdomen-nondistended positive bowel sounds soft nontender without masses or hepatosplenomegaly  Cervix-not examined  Extremities without cyanosis clubbing or edema  Neurologic grossly intact    Labs;      NST-as performed and read by myself; reactive NST without contractions    Impression;  IUP AT 26w3d  Cervical insufficiency-status post betamethasone series      Plan;  1 hour GTT today  Consultation with psychiatry is pending due to patient's expressed feelings of depression from being on long-term bedrest    Jason Mercado MD  "

## 2021-03-14 NOTE — PROGRESS NOTES
1900- Received report from ISIDORO Askew RN.   1945- Pt on couch with mom and FOB.   2015- Pt denies LOF, VB, or contractions. She states +FM. Pt requesting monitoring to be done in one hour. RN lets pt know visiting hours are over.   0700- Report given to ZEYAD Montes RN. POC discussed, all questions answered.

## 2021-03-14 NOTE — PROGRESS NOTES
20 y.o.  at 26w3d  , with advanced cervical change , and long term hospitalization . Patient and and her nurse expressed signs of depression and has been seen by Social service .   P. Call Psych consult

## 2021-03-14 NOTE — PROGRESS NOTES
Assessment complete. VSS. Discussed GTT with patient, questions answered. States + FM. Denies LOF, VB, UCs. Wants to wait 1 hour for monitoring.   1420 - Monitoring done  1455 - 50g glucose tolerance drink provided to pt. Requested that Leana with lab come to draw at 1600.   1900 - Report to FAUSTINO Spicer RN

## 2021-03-15 VITALS
SYSTOLIC BLOOD PRESSURE: 91 MMHG | HEIGHT: 67 IN | DIASTOLIC BLOOD PRESSURE: 42 MMHG | TEMPERATURE: 98 F | BODY MASS INDEX: 32.39 KG/M2 | WEIGHT: 206.35 LBS | OXYGEN SATURATION: 96 % | HEART RATE: 61 BPM | RESPIRATION RATE: 16 BRPM

## 2021-03-15 PROCEDURE — 59025 FETAL NON-STRESS TEST: CPT | Mod: 26 | Performed by: OBSTETRICS & GYNECOLOGY

## 2021-03-15 PROCEDURE — A9270 NON-COVERED ITEM OR SERVICE: HCPCS | Performed by: OBSTETRICS & GYNECOLOGY

## 2021-03-15 PROCEDURE — 700102 HCHG RX REV CODE 250 W/ 637 OVERRIDE(OP): Performed by: OBSTETRICS & GYNECOLOGY

## 2021-03-15 PROCEDURE — 99238 HOSP IP/OBS DSCHRG MGMT 30/<: CPT | Mod: 25 | Performed by: OBSTETRICS & GYNECOLOGY

## 2021-03-15 RX ADMIN — PRENATAL WITH FERROUS FUM AND FOLIC ACID 1 TABLET: 3080; 920; 120; 400; 22; 1.84; 3; 20; 10; 1; 12; 200; 27; 25; 2 TABLET ORAL at 12:43

## 2021-03-15 ASSESSMENT — FIBROSIS 4 INDEX: FIB4 SCORE: 0.28

## 2021-03-15 NOTE — DISCHARGE SUMMARY
Discharge Summary:     Date of Admission: 2021  Date of Discharge: 03/15/21      Admitting diagnosis:    1. SIUP @ 23w6d  2.  Cervical insufficiency    Discharge Diagnosis:   1. EMMANUEL @ 26w4d  2. Arrested  labor vs cervical insufficiency     PMHx: denies    OB History    Para Term  AB Living   2 0   0 1 0   SAB TAB Ectopic Molar Multiple Live Births   1       0 0      # Outcome Date GA Lbr Bran/2nd Weight Sex Delivery Anes PTL Lv   2 Current            1 SAB 07/10/20 18w3d  0.175 kg (6.2 oz) F Vag-Spont None Y ND      Complications: Chorioamnionitis       Past Surgical History:   Procedure Laterality Date   • OPEN REDUCTION      (L) wrist radius      Asa [aspirin]      Hospital Course:   20 y.o.  sent from the office for cervical exam .  Patient initially was having routine scheduled ultrasound and there was concern for cervical dilation on imaging.  Was seen in the office where the cervix was noted to be dilated and she was sent to the hospital for evaluation.  Patient was admitted for concern for cervical insufficiency, was noted to have some contractions but was not feeling them at that time.  The patient did not progress in labor and was kept on antepartum for extended surveillance given concern for cervical insufficiency and the risk of continued cervical dilation without symptoms.  She did receive betamethasone  and  as well as an initial course of magnesium sulfate for fetal neuro protection.  GBS was negative on 3/1/2021.  Fetal status remained reassuring during her stay.  On day of discharge the patient was counseled extensively on risks of discharge given the concern for cervical insufficiency and the possibility of rapid continued cervical dilation at home, including the increased risk of  mortality.  Patient strongly desired discharge and was accepting of these risks and was sent home.  The patient did have repeat cervical examination prior to  discharge and was noted to have no cervical change since arrival.      Physical Exam:  See progress note dated 3/15/2021        Current Facility-Administered Medications   Medication Dose   • prenatal plus vitamin (STUARTNATAL 1+1) 27-1 MG tablet 1 tablet  1 tablet   • ondansetron (ZOFRAN) syringe/vial injection 4 mg  4 mg             Activity:   Discharge to home  As tolerated, avoid exercise or heavy activity       Follow up: [facility] [place] 1 to 2 weeks for OB visit   Return to hospital immediately for vaginal bleeding, contractions, decreased fetal movement, loss of fluid.    Discharge Meds:   prenatal vitamin daily      Janeth Mason MD  [facility], [facility]

## 2021-03-15 NOTE — PROGRESS NOTES
1900 - Report received. POC discussed. Pt mother at bedside.   2036 - Pt placed on monitor. Pt denies contractions, leaking of fluid, or bleeding. +FM.   2125 - Reactive tracing obtained. Monitors removed. Denies needs at this time.   0700 - Report given. POC discussed.

## 2021-03-15 NOTE — DISCHARGE INSTRUCTIONS
General Instructions:  · If you think you are in labor, time contractions (lying on your left side) from the beginning of one contraction to the beginning of the next contraction for at least one hour.  · Increase fluid intake: you should consume 10-12 8 oz glasses of non-caffeinated fluid per day.  · Report any pressure or burning on urination to your physician.  · Monitor fetal movement: If you notice an absence or decrease in fetal movement, drink a large glass of water and rest on your side.  If there is no increase in movement, call your physician or go to the hospital for further evaluation.  · Report any sudden, sharp abdominal pain.  · Report any bleeding.  Spotting or pinkish discharge is normal after vaginal exam.  You may also spot after sexual intercourse.    Pre-term Labor (<37 weeks):  Call your physician or return to the hospital if:  · You have painless regular contractions more than 4 in one hour.  · Your water breaks (remember time and color).  · You have menstrual-like cramps, a low dull backache or pressure in your pelvis or back.  · Your baby does not move enough to complete the daily kick count (10 movements in 2 hours).  · Your baby moves much less often than on the days before or you have not felt your baby move all day.  · Please review the MEDICATION LIST section of your AFTER VISIT SUMMARY document.  · Take your medication as prescribed    Make and appointment [facility] to follow up with MD the week of 3/29/2021.      Other Instructions:  Please carefully review your entire AFTER VISIT SUMMARY document for all discharge instructions.

## 2021-03-15 NOTE — PROGRESS NOTES
0700- Report received from PAT Spicer RN. Pt sleeping.  1100- Pt sleeping.  1230- Pt sitting up eating. Pt denies LOF, VB, or UC's. Pt reports +FM. Pt denies any discomfort at this time.  Pt requesting to wait until 1330 for FHT monitoring.  1315- Dr. Driscoll and Dr. Scott at the bedside, POC discussed for pt to be discharged home after FHT tracing. SVE (FT/80/) unchanged, Dr. Driscoll.  1332- US and toco on. Audible +FM present.  1400- Dr. Driscoll reviewed FHT tracing. Pt to be discharged home and follow up in the office for a PNV in 2 weeks.   Dr. Driscoll and Dr. Scott provided labor precautions in Portuguese.  1415- Pt called  to come pick her up.  Pt provided with discharge instructions, labor precautions provided in Portuguese by Dr. Scott and in writing.   Pt verbalizes understanding.  1450- Pt transported out of hospital via WC by CNA.   FOB transferring pt home.

## 2021-03-15 NOTE — PROGRESS NOTES
OB Progress Note:  26w4d  Date of Admission: 2021  Came to see pt with in person physician  Dr. Raad Scott      Subjective:   Denies ctx, LOF, VB.  +FM.  Feeling well and strongly desiring discharge home.     Patient reports she does not feel she is depressed, feels mostly that she is frustrated being stuck here in the hospital.  Denies any thoughts of self-harm.      Objective:   24hr VS:  Temp:  [36.4 °C (97.5 °F)-37.2 °C (98.9 °F)] 36.4 °C (97.6 °F)  Pulse:  [65-99] 68  Resp:  [14-18] 16  BP: ()/(50-74) 97/50    Gen: AAO, NAD  Abdomen: gravid, soft, NT  Ext: NT, no edema  SVE: FT/50/-3     NST:  Indication: 150    150/mod variability/+ accelerations/no decelerations  [place]: no ctx      Meds:     Current Facility-Administered Medications:   •  prenatal plus vitamin (STUARTNATAL 1+1) 27-1 MG tablet 1 tablet, 1 tablet, Oral, Daily-0800, Verona Moyer D.NADIA, Stopped at 21 0800  •  ondansetron (ZOFRAN) syringe/vial injection 4 mg, 4 mg, Intravenous, Q6HRS PRN, Cesar Landa M.D.    Labs:    Lab:   Recent Results (from the past 72 hour(s))   GLUCOSE 1HR GESTATIONAL    Collection Time: 21  3:58 PM   Result Value Ref Range    Glucose, Post Dose 105 70 - 139 mg/dL       A/P:  20 y.o.  @ 26w4d admitted with cervical incompetence    - Appreciate MFM involvement; currently recommended for continued inpatient management with bed rest until at least 28wks.  Pt however strongly desires discharge and is struggling with being her for a long period of time, feels she can have minimal activity at home.  Discussed with patient that for patients with arrested  labor there is no evidence that continued bedrest improved pregnancy outcome, but the concern from MFM and why Dr. Landa recommends continued inpatient management is because of the concern for cervical insufficiency which is concerning for the risk of dilation without symptoms.  Discussed this can happen rapidly without  symptoms, and that without evidence to guide management of women in her particular situation, we tend to recommend conservative management which would be to stay here.  Pt does report she knows when she is having contractions and can return to the hospital rapidly; lives with in [place].  Discuss that should she go home and have rapid cervical dilation/resumption of labor, or rupture her bag of water, there may be an increased risk of  mortality should she decide to go home.  Discussed that as long as she is accepting of this risk I think it is reasonable to consider sending her home today.  Discussed that recommendation from our MFM consultant is definitely to stay in-house for continued antepartum management at this time.  Patient expressed understanding, would like to home.      - SIUP @ 26w4d    Cont NST qshift   S/p BMZ -   S/p mag sulfate for neuroprotection; plan to re-initiate if signs of labor    - GBS neg 3/1    - normal 1hr 3/15; will be due for Tdap at 27wks     -Concern for mood symptoms/depression.  Patient symptoms seem to primarily to be understandably linked to being stuck in the hospital.  Denies thoughts of self-harm and feels that she does not need medications or referral especially when she is discharged home.  Will need to continue to follow in office.      dispo: reviewed  labor precautions with pt to return with any contractions, loss of fluid, vaginal bleeding, decreased fetal movement.    F/u 1-2wks in the office        Janeth Mason MD  [facility], Women's Health

## 2021-03-31 ENCOUNTER — ROUTINE PRENATAL (OUTPATIENT)
Dept: OBGYN | Facility: CLINIC | Age: 21
End: 2021-03-31

## 2021-03-31 VITALS — SYSTOLIC BLOOD PRESSURE: 110 MMHG | BODY MASS INDEX: 33.42 KG/M2 | DIASTOLIC BLOOD PRESSURE: 60 MMHG | WEIGHT: 213.4 LBS

## 2021-03-31 DIAGNOSIS — O34.33 CERVICAL INSUFFICIENCY DURING PREGNANCY IN THIRD TRIMESTER, ANTEPARTUM: ICD-10-CM

## 2021-03-31 DIAGNOSIS — Z34.90 PREGNANCY, UNSPECIFIED GESTATIONAL AGE: ICD-10-CM

## 2021-03-31 PROCEDURE — 90040 PR PRENATAL FOLLOW UP: CPT | Performed by: OBSTETRICS & GYNECOLOGY

## 2021-03-31 ASSESSMENT — FIBROSIS 4 INDEX: FIB4 SCORE: 0.28

## 2021-03-31 NOTE — PROGRESS NOTES
OB follow up   + fetal movement. Active  No VB, LOF or UC's.  Phone # [phone]  Preferred pharmacy confirmed.  C/o  Hip pain  BTL not old enough   TDAP would like to think about it   Kick count sheet given today.

## 2021-03-31 NOTE — LETTER
Cuente los Movimientos de archer Bebé  Otro paso importante para la marlin de archer bebé    Thania Dias     [facility]            Dept: [phone]    ¿Cuántas semanas tiene de embarazo? 28w6d    Fecha cuando tiene que comenzar a contar el movimiento: 03/31/2021                  Job debe usar shea diagrama    Larissa manera en que archer doctor puede controlar a marlin de archer bebé es sabiendo cuantas veces se mueve archer bebé en el útero, o por medio de las “pataditas”.  Usted podrá ayudarle a archer médico al usar cada día el siguiente diagrama.    Cada día, usted debe prestar atención a cuantas horas le lleva a archer bebé moverse 10 veces.  Comience a contar en la mañana, lo antes posible después de haberse levantado.    · Primeramente, escriba la hora en que se mueve archer bebé, hasta llegar a 10 veces.  · Colóquele un check o palomita a cada cuadrito cada vez que archer bebé se mueva hasta que complete 10 veces.  · Escriba la hora cuando termine de contar 10 veces en la última columna.  · Sume el total del tiempo que le llevó contar los 10 movimientos.  · Finalmente, complete el cuadrito de cuantas horas le llevó hacerlo.    Después de piter contado los 10 movimientos, ya no tendrá que contar los demás movimientos por el autumn del día.  A la mañana siguiente, comience a contar de nuevo cuantas veces se mueve el bebé desde el momento en que se levante.    ¿Qué tendría que considerarse un “movimiento”?  Es difícil de decirlo porque es distinto de larissa madre a otra, y de un embarazo a otro.  Lo importante es que cuente el movimiento de la misma manera mike el transcurso de archer embarazo.  Si tiene preguntas adicionales, pregúntele a archer doctor.    ¡Cuente cuidadosamente cada día!     MUESTRA:  Semana 28    ¿Cuántas horas le ha llevado sentir 10 movimientos?        Hora de Inicio     1     2     3     4     5     6     7     8     9     10   Hora de Finlizar   Juan Carlos. 8:20 ·  ·  ·  ·  ·  ·  ·  ·  ·  ·   11:40   Mar.               Mié.               Alcides.               Steffanye.               Sáb.               Dom.                 IMPORTANTE:  Usted debe contactar a archer doctor si le lleva más de 2 horas sentir 10 movimientos de archer bebé.    Cada mañana, escriba la hora de inicio y comience a contar los movimientos de archer bebé.  Hágalo colocándole un check o palomita a cada cuadrito cada vez que sienta un movimiento de archer bebé.  Cuando haya sentido 10 “pataditas”, escriba la hora en que terminó de contar en la última columna.  Luego, complete en la cajita (arriba de la marito de check o palomita) el número total de horas que le llevó hacerlo.  Asegúrese de leer completamente las instrucciones en la página anterior.

## 2021-03-31 NOTE — PROGRESS NOTES
S: Pt presents for routine OB follow up.  No contractions, vaginal bleeding, or leaking fluids. Good fetal movement.    Questions answered.    O: /60   Wt 96.8 kg (213 lb 6.4 oz)   LMP 09/10/2020   BMI 33.42 kg/m²   Patients' weight gain, fluid intake and exercise level discussed.  Vitals, fundal height , fetal position, and FHR reviewed on flowsheet    Lab:No results found for this or any previous visit (from the past 336 hour(s)).    A/P:  20 y.o.  at 28w6d presents for routine obstetric follow-up.  Size equals dates and/or scan    LOUISE 2021 by LMP = 21 wk US  --Hx of 18 week loss infection found, possible cervical insufficiency involved leading to infection and demise  --cervical insufficiency found at 23+6, dilation to 1cm, no measurable cervix   No cerclage placed per [place]   In patient for 1 month then left due to severe depression and anxiety  PNL wnl  3rd tri labs: 1hr GTT wnl

## 2021-04-02 ENCOUNTER — HOSPITAL ENCOUNTER (OUTPATIENT)
Dept: LAB | Facility: MEDICAL CENTER | Age: 21
End: 2021-04-02
Attending: OBSTETRICS & GYNECOLOGY
Payer: COMMERCIAL

## 2021-04-02 DIAGNOSIS — Z34.90 PREGNANCY, UNSPECIFIED GESTATIONAL AGE: ICD-10-CM

## 2021-04-02 LAB
ERYTHROCYTE [DISTWIDTH] IN BLOOD BY AUTOMATED COUNT: 43.1 FL (ref 35.9–50)
HCT VFR BLD AUTO: 38.3 % (ref 37–47)
HGB BLD-MCNC: 12.5 G/DL (ref 12–16)
MCH RBC QN AUTO: 30 PG (ref 27–33)
MCHC RBC AUTO-ENTMCNC: 32.6 G/DL (ref 33.6–35)
MCV RBC AUTO: 91.8 FL (ref 81.4–97.8)
PLATELET # BLD AUTO: 279 K/UL (ref 164–446)
PMV BLD AUTO: 11.9 FL (ref 9–12.9)
RBC # BLD AUTO: 4.17 M/UL (ref 4.2–5.4)
TREPONEMA PALLIDUM IGG+IGM AB [PRESENCE] IN SERUM OR PLASMA BY IMMUNOASSAY: NORMAL
WBC # BLD AUTO: 7.4 K/UL (ref 4.8–10.8)

## 2021-04-16 ENCOUNTER — ROUTINE PRENATAL (OUTPATIENT)
Dept: OBGYN | Facility: CLINIC | Age: 21
End: 2021-04-16

## 2021-04-16 VITALS — DIASTOLIC BLOOD PRESSURE: 68 MMHG | SYSTOLIC BLOOD PRESSURE: 114 MMHG | WEIGHT: 220.5 LBS | BODY MASS INDEX: 34.54 KG/M2

## 2021-04-16 DIAGNOSIS — Z87.59 HISTORY OF FETAL DEMISE, NOT CURRENTLY PREGNANT: ICD-10-CM

## 2021-04-16 DIAGNOSIS — O34.33 CERVICAL INSUFFICIENCY DURING PREGNANCY IN THIRD TRIMESTER, ANTEPARTUM: ICD-10-CM

## 2021-04-16 DIAGNOSIS — Z3A.31 31 WEEKS GESTATION OF PREGNANCY: ICD-10-CM

## 2021-04-16 PROCEDURE — 90040 PR PRENATAL FOLLOW UP: CPT | Performed by: OBSTETRICS & GYNECOLOGY

## 2021-04-16 ASSESSMENT — FIBROSIS 4 INDEX: FIB4 SCORE: 0.28

## 2021-04-16 NOTE — PROGRESS NOTES
Chief complaint: Return visit    S: Pt presents for routine OB follow up. Good fetal movement.  No contractions, vaginal bleeding, or leakage of fluid.    Questions answered.    Doing well, denies any signs or symptoms of  labor since being discharged from the hospital    Has been having some issues around ligament pain as well as heartburn.    O: /68   Wt 100 kg (220 lb 8 oz)   LMP 09/10/2020   BMI 34.54 kg/m²   Patients' weight gain, fluid intake and exercise level discussed.  Vitals, fundal height , fetal position, and FHR reviewed on flowsheet    Lab:  Recent Results (from the past 336 hour(s))   CBC WITHOUT DIFFERENTIAL    Collection Time: 21 12:14 PM   Result Value Ref Range    WBC 7.4 4.8 - 10.8 K/uL    RBC 4.17 (L) 4.20 - 5.40 M/uL    Hemoglobin 12.5 12.0 - 16.0 g/dL    Hematocrit 38.3 37.0 - 47.0 %    MCV 91.8 81.4 - 97.8 fL    MCH 30.0 27.0 - 33.0 pg    MCHC 32.6 (L) 33.6 - 35.0 g/dL    RDW 43.1 35.9 - 50.0 fL    Platelet Count 279 164 - 446 K/uL    MPV 11.9 9.0 - 12.9 fL   T.PALLIDUM AB EIA    Collection Time: 21 12:14 PM   Result Value Ref Range    Syphilis, Treponemal Qual Non-Reactive Non-Reactive       A/P:  20 y.o.  at 31w1d presents for routine obstetric follow-up.  Size equals dates and/or scan    1.  Continue prenatal vitamins.  2.  Fetal kick counts.  3.  Exercise at least 30 minutes daily.  4.  Drink at least 2L of water daily  5.  Labor precautions educated.  6.  Follow-up in 2 weeks.  7.  GBS at 36 weeks    Precautions discussed with patient.    Patient will obtain a pregnancy support belt    Patient will begin using Pepcid for acid reflux control    Labs and ultrasound reviewed.    All questions answered

## 2021-04-16 NOTE — PROGRESS NOTES
Pt. Here for OB/FU. Reports Good FM.   Good # [phone]  Pt. Denies VB, LOF, or UC's.   Pharmacy verified.   Chaperone offered and indicated  Pt states no concerns at the moment.   Tdap would like to think about VIS given

## 2021-04-30 ENCOUNTER — ROUTINE PRENATAL (OUTPATIENT)
Dept: OBGYN | Facility: CLINIC | Age: 21
End: 2021-04-30

## 2021-04-30 VITALS — BODY MASS INDEX: 34.61 KG/M2 | DIASTOLIC BLOOD PRESSURE: 68 MMHG | WEIGHT: 221 LBS | SYSTOLIC BLOOD PRESSURE: 106 MMHG

## 2021-04-30 DIAGNOSIS — Z87.59 HISTORY OF FETAL DEMISE, NOT CURRENTLY PREGNANT: ICD-10-CM

## 2021-04-30 DIAGNOSIS — O26.843 SIZE OF FETUS INCONSISTENT WITH DATES IN THIRD TRIMESTER: ICD-10-CM

## 2021-04-30 DIAGNOSIS — Z3A.33 33 WEEKS GESTATION OF PREGNANCY: ICD-10-CM

## 2021-04-30 DIAGNOSIS — O34.33 CERVICAL INSUFFICIENCY DURING PREGNANCY IN THIRD TRIMESTER, ANTEPARTUM: ICD-10-CM

## 2021-04-30 PROCEDURE — 90715 TDAP VACCINE 7 YRS/> IM: CPT

## 2021-04-30 PROCEDURE — 90471 IMMUNIZATION ADMIN: CPT

## 2021-04-30 PROCEDURE — 90040 PR PRENATAL FOLLOW UP: CPT | Performed by: OBSTETRICS & GYNECOLOGY

## 2021-04-30 ASSESSMENT — FIBROSIS 4 INDEX: FIB4 SCORE: 0.28

## 2021-04-30 NOTE — PROGRESS NOTES
Pt here today for OB follow up  Pt states no VB or LOF   Reports +FM  Good # [phone]   Pharmacy Confirmed.  Tdap Today   L Deltoid

## 2021-04-30 NOTE — PROGRESS NOTES
Chief complaint: Return visit      S: Pt presents for routine OB follow up. Good fetal movement.  No contractions, vaginal bleeding, or leakage of fluid.    Questions answered.    O: /68   Wt 100 kg (221 lb)   LMP 09/10/2020   BMI 34.61 kg/m²   Patients' weight gain, fluid intake and exercise level discussed.  Vitals, fundal height , fetal position, and FHR reviewed on flowsheet    Lab:No results found for this or any previous visit (from the past 336 hour(s)).    A/P:  20 y.o.  at 33w1d presents for routine obstetric follow-up.  Size equals dates and/or scan    1.  Continue prenatal vitamins.  2.  Fetal kick counts.  3.  Exercise at least 30 minutes daily.  4.  Drink at least 2L of water daily  5.  Labor precautions educated.  6.  Follow-up in 2 weeks.  7.  GBS at 36 weeks      Size greater than dates, will order follow-up ultrasound.    No signs of  labor, precautions discussed with patient    Follow-up 2 weeks    Questions answered

## 2021-05-03 ENCOUNTER — APPOINTMENT (OUTPATIENT)
Dept: RADIOLOGY | Facility: IMAGING CENTER | Age: 21
End: 2021-05-03
Attending: OBSTETRICS & GYNECOLOGY
Payer: MEDICAID

## 2021-05-03 DIAGNOSIS — O26.843 SIZE OF FETUS INCONSISTENT WITH DATES IN THIRD TRIMESTER: ICD-10-CM

## 2021-05-03 PROCEDURE — 76816 OB US FOLLOW-UP PER FETUS: CPT | Mod: TC | Performed by: OBSTETRICS & GYNECOLOGY

## 2021-05-06 ENCOUNTER — TELEPHONE (OUTPATIENT)
Dept: OBGYN | Facility: CLINIC | Age: 21
End: 2021-05-06

## 2021-05-14 ENCOUNTER — ROUTINE PRENATAL (OUTPATIENT)
Dept: OBGYN | Facility: CLINIC | Age: 21
End: 2021-05-14
Payer: MEDICAID

## 2021-05-14 VITALS — SYSTOLIC BLOOD PRESSURE: 121 MMHG | DIASTOLIC BLOOD PRESSURE: 79 MMHG | WEIGHT: 226 LBS | BODY MASS INDEX: 35.4 KG/M2

## 2021-05-14 DIAGNOSIS — O34.33 CERVICAL INSUFFICIENCY DURING PREGNANCY IN THIRD TRIMESTER, ANTEPARTUM: ICD-10-CM

## 2021-05-14 PROCEDURE — 90040 PR PRENATAL FOLLOW UP: CPT | Performed by: OBSTETRICS & GYNECOLOGY

## 2021-05-14 ASSESSMENT — FIBROSIS 4 INDEX: FIB4 SCORE: 0.28

## 2021-05-14 NOTE — PROGRESS NOTES
Pt here today for OB follow up  Pt states no VB or LOF   Reports +FM  Good # [phone]   Pharmacy Confirmed.  Chaperone offered and provided.

## 2021-05-14 NOTE — PROGRESS NOTES
OB Followup;    35w1d    Patient Active Problem List    Diagnosis Date Noted   • Size of fetus inconsistent with dates in third trimester 2021   • 33 weeks gestation of pregnancy 2021   • Cervical insufficiency during pregnancy in third trimester, antepartum 2021   • History of fetal demise, likely cervical insufficiency leading to infection and demise 2021       Vitals:    21 1457   BP: 121/79   Weight: 103 kg (226 lb)       Patient presents for followup of OB care. Currently doing well . Good fetal movement no leakage of fluid no contractions or vaginal bleeding        Size equals dates, normal fetal heart rate      Patient denies contractions leakage of fluid or vaginal bleeding  History of cervical insufficiency stable    Labor precautions given  Discussed proper weight gain during pregnancy.    Signs and symptoms of labor/ labor discussed   Discussed our group's policy on prenatal checkups and delivery  Discussed Covid precautions  Discussed Covid vaccination recommendations from CDC/ACOG  Discussed proper exercise during pregnancy  Discussed proper oral fluid hydration  Currently taking prenatal vitamins as instructed  Reviewed fetal kick counts and appropriate fetal movement during pregnancy  Reviewed postpartum birth control methods  All questions answered in detail    Followup in  1 weeks

## 2021-05-25 ENCOUNTER — ROUTINE PRENATAL (OUTPATIENT)
Dept: OBGYN | Facility: CLINIC | Age: 21
End: 2021-05-25
Payer: MEDICAID

## 2021-05-25 ENCOUNTER — HOSPITAL ENCOUNTER (OUTPATIENT)
Facility: MEDICAL CENTER | Age: 21
End: 2021-05-25
Attending: OBSTETRICS & GYNECOLOGY
Payer: MEDICAID

## 2021-05-25 VITALS — WEIGHT: 229 LBS | SYSTOLIC BLOOD PRESSURE: 120 MMHG | BODY MASS INDEX: 35.87 KG/M2 | DIASTOLIC BLOOD PRESSURE: 82 MMHG

## 2021-05-25 DIAGNOSIS — Z34.90 PREGNANCY, UNSPECIFIED GESTATIONAL AGE: ICD-10-CM

## 2021-05-25 PROCEDURE — 87150 DNA/RNA AMPLIFIED PROBE: CPT

## 2021-05-25 PROCEDURE — 90040 PR PRENATAL FOLLOW UP: CPT | Performed by: OBSTETRICS & GYNECOLOGY

## 2021-05-25 PROCEDURE — 87081 CULTURE SCREEN ONLY: CPT

## 2021-05-25 ASSESSMENT — FIBROSIS 4 INDEX: FIB4 SCORE: 0.28

## 2021-05-25 NOTE — PROGRESS NOTES
Patient is at 36w5d .no complaints  Fetal Movement : positive       Patients' weight gain, fluid intake and exercise level discussed.Vitals, fundal height , fetal position, and FHR reviewed on flowsheet.    .../82   Wt 104 kg (229 lb)   LMP 09/10/2020   BMI 35.87 kg/m²   No past medical history on file.  Patient Active Problem List    Diagnosis Date Noted   • Size of fetus inconsistent with dates in third trimester 04/30/2021   • 33 weeks gestation of pregnancy 04/16/2021   • Cervical insufficiency during pregnancy in third trimester, antepartum 03/31/2021   • History of fetal demise, likely cervical insufficiency leading to infection and demise 02/16/2021         Lab:No results found for this or any previous visit (from the past 336 hour(s)).    Assessment:  1  36w5d  2. . Doing well  3. Size equals Dates and/or Scan  4. Weight gain: normal: Yes, excessive:No                        Plan.  1. Rediscuss diet.  2. Increase water intake PRN  3. Continue vitamins.  4. Kick counts as instructed.  5. Discuss support hose and proper shoe wear as indicated.  6. Ambulate

## 2021-05-25 NOTE — PROGRESS NOTES
OB follow up   + fetal movement.  No VB, LOF or UC's.  Phone # [phone]  Preferred pharmacy confirmed.  GBS today

## 2021-05-26 DIAGNOSIS — Z34.90 PREGNANCY, UNSPECIFIED GESTATIONAL AGE: ICD-10-CM

## 2021-05-27 LAB — GP B STREP DNA SPEC QL NAA+PROBE: POSITIVE

## 2021-05-28 PROBLEM — B95.1 GROUP BETA STREP POSITIVE: Status: ACTIVE | Noted: 2021-05-28

## 2021-06-03 ENCOUNTER — HOSPITAL ENCOUNTER (INPATIENT)
Facility: MEDICAL CENTER | Age: 21
LOS: 2 days | End: 2021-06-05
Attending: OBSTETRICS & GYNECOLOGY | Admitting: OBSTETRICS & GYNECOLOGY
Payer: MEDICAID

## 2021-06-03 LAB
BASOPHILS # BLD AUTO: 0.1 % (ref 0–1.8)
BASOPHILS # BLD: 0.01 K/UL (ref 0–0.12)
EOSINOPHIL # BLD AUTO: 0.02 K/UL (ref 0–0.51)
EOSINOPHIL NFR BLD: 0.2 % (ref 0–6.9)
ERYTHROCYTE [DISTWIDTH] IN BLOOD BY AUTOMATED COUNT: 44.5 FL (ref 35.9–50)
ERYTHROCYTE [DISTWIDTH] IN BLOOD BY AUTOMATED COUNT: 45.2 FL (ref 35.9–50)
HCT VFR BLD AUTO: 35.1 % (ref 37–47)
HCT VFR BLD AUTO: 36.1 % (ref 37–47)
HGB BLD-MCNC: 11.5 G/DL (ref 12–16)
HGB BLD-MCNC: 12 G/DL (ref 12–16)
HOLDING TUBE BB 8507: NORMAL
IMM GRANULOCYTES # BLD AUTO: 0.05 K/UL (ref 0–0.11)
IMM GRANULOCYTES NFR BLD AUTO: 0.5 % (ref 0–0.9)
LYMPHOCYTES # BLD AUTO: 1.89 K/UL (ref 1–4.8)
LYMPHOCYTES NFR BLD: 20.7 % (ref 22–41)
MCH RBC QN AUTO: 29 PG (ref 27–33)
MCH RBC QN AUTO: 29.3 PG (ref 27–33)
MCHC RBC AUTO-ENTMCNC: 32.8 G/DL (ref 33.6–35)
MCHC RBC AUTO-ENTMCNC: 33.2 G/DL (ref 33.6–35)
MCV RBC AUTO: 88 FL (ref 81.4–97.8)
MCV RBC AUTO: 88.4 FL (ref 81.4–97.8)
MONOCYTES # BLD AUTO: 0.71 K/UL (ref 0–0.85)
MONOCYTES NFR BLD AUTO: 7.8 % (ref 0–13.4)
NEUTROPHILS # BLD AUTO: 6.43 K/UL (ref 2–7.15)
NEUTROPHILS NFR BLD: 70.7 % (ref 44–72)
NRBC # BLD AUTO: 0 K/UL
NRBC BLD-RTO: 0 /100 WBC
PLATELET # BLD AUTO: 252 K/UL (ref 164–446)
PLATELET # BLD AUTO: 259 K/UL (ref 164–446)
PMV BLD AUTO: 12 FL (ref 9–12.9)
PMV BLD AUTO: 12.5 FL (ref 9–12.9)
RBC # BLD AUTO: 3.97 M/UL (ref 4.2–5.4)
RBC # BLD AUTO: 4.1 M/UL (ref 4.2–5.4)
SARS-COV+SARS-COV-2 AG RESP QL IA.RAPID: NOTDETECTED
SARS-COV-2 RNA RESP QL NAA+PROBE: NOTDETECTED
SPECIMEN SOURCE: NORMAL
SPECIMEN SOURCE: NORMAL
WBC # BLD AUTO: 13.8 K/UL (ref 4.8–10.8)
WBC # BLD AUTO: 9.1 K/UL (ref 4.8–10.8)

## 2021-06-03 PROCEDURE — 770002 HCHG ROOM/CARE - OB PRIVATE (112)

## 2021-06-03 PROCEDURE — 85025 COMPLETE CBC W/AUTO DIFF WBC: CPT

## 2021-06-03 PROCEDURE — 85027 COMPLETE CBC AUTOMATED: CPT

## 2021-06-03 PROCEDURE — U0003 INFECTIOUS AGENT DETECTION BY NUCLEIC ACID (DNA OR RNA); SEVERE ACUTE RESPIRATORY SYNDROME CORONAVIRUS 2 (SARS-COV-2) (CORONAVIRUS DISEASE [COVID-19]), AMPLIFIED PROBE TECHNIQUE, MAKING USE OF HIGH THROUGHPUT TECHNOLOGIES AS DESCRIBED BY CMS-2020-01-R: HCPCS

## 2021-06-03 PROCEDURE — 0KQM0ZZ REPAIR PERINEUM MUSCLE, OPEN APPROACH: ICD-10-PCS | Performed by: OBSTETRICS & GYNECOLOGY

## 2021-06-03 PROCEDURE — 700101 HCHG RX REV CODE 250

## 2021-06-03 PROCEDURE — 36415 COLL VENOUS BLD VENIPUNCTURE: CPT

## 2021-06-03 PROCEDURE — 700105 HCHG RX REV CODE 258

## 2021-06-03 PROCEDURE — 700102 HCHG RX REV CODE 250 W/ 637 OVERRIDE(OP)

## 2021-06-03 PROCEDURE — U0005 INFEC AGEN DETEC AMPLI PROBE: HCPCS

## 2021-06-03 PROCEDURE — 700111 HCHG RX REV CODE 636 W/ 250 OVERRIDE (IP)

## 2021-06-03 PROCEDURE — 59409 OBSTETRICAL CARE: CPT

## 2021-06-03 PROCEDURE — A9270 NON-COVERED ITEM OR SERVICE: HCPCS

## 2021-06-03 PROCEDURE — 59410 OBSTETRICAL CARE: CPT | Performed by: NURSE PRACTITIONER

## 2021-06-03 PROCEDURE — 700111 HCHG RX REV CODE 636 W/ 250 OVERRIDE (IP): Performed by: NURSE PRACTITIONER

## 2021-06-03 PROCEDURE — 304965 HCHG RECOVERY SERVICES

## 2021-06-03 PROCEDURE — 87426 SARSCOV CORONAVIRUS AG IA: CPT

## 2021-06-03 RX ORDER — LIDOCAINE HYDROCHLORIDE 10 MG/ML
INJECTION, SOLUTION INFILTRATION; PERINEURAL
Status: COMPLETED
Start: 2021-06-03 | End: 2021-06-03

## 2021-06-03 RX ORDER — BISACODYL 10 MG
10 SUPPOSITORY, RECTAL RECTAL PRN
Status: DISCONTINUED | OUTPATIENT
Start: 2021-06-03 | End: 2021-06-05 | Stop reason: HOSPADM

## 2021-06-03 RX ORDER — MISOPROSTOL 200 UG/1
600 TABLET ORAL
Status: DISCONTINUED | OUTPATIENT
Start: 2021-06-03 | End: 2021-06-05 | Stop reason: HOSPADM

## 2021-06-03 RX ORDER — ACETAMINOPHEN 325 MG/1
325 TABLET ORAL EVERY 4 HOURS PRN
Status: DISCONTINUED | OUTPATIENT
Start: 2021-06-03 | End: 2021-06-05 | Stop reason: HOSPADM

## 2021-06-03 RX ORDER — OXYCODONE AND ACETAMINOPHEN 10; 325 MG/1; MG/1
1 TABLET ORAL EVERY 4 HOURS PRN
Status: DISCONTINUED | OUTPATIENT
Start: 2021-06-03 | End: 2021-06-05 | Stop reason: HOSPADM

## 2021-06-03 RX ORDER — METHYLERGONOVINE MALEATE 0.2 MG/ML
0.2 INJECTION INTRAVENOUS ONCE
Status: COMPLETED | OUTPATIENT
Start: 2021-06-03 | End: 2021-06-03

## 2021-06-03 RX ORDER — MISOPROSTOL 200 UG/1
800 TABLET ORAL
Status: DISCONTINUED | OUTPATIENT
Start: 2021-06-03 | End: 2021-06-03 | Stop reason: HOSPADM

## 2021-06-03 RX ORDER — METHYLERGONOVINE MALEATE 0.2 MG/ML
INJECTION INTRAVENOUS
Status: COMPLETED
Start: 2021-06-03 | End: 2021-06-03

## 2021-06-03 RX ORDER — SODIUM CHLORIDE 9 MG/ML
INJECTION, SOLUTION INTRAVENOUS
Status: COMPLETED
Start: 2021-06-03 | End: 2021-06-03

## 2021-06-03 RX ORDER — SODIUM CHLORIDE, SODIUM LACTATE, POTASSIUM CHLORIDE, CALCIUM CHLORIDE 600; 310; 30; 20 MG/100ML; MG/100ML; MG/100ML; MG/100ML
INJECTION, SOLUTION INTRAVENOUS PRN
Status: DISCONTINUED | OUTPATIENT
Start: 2021-06-03 | End: 2021-06-05 | Stop reason: HOSPADM

## 2021-06-03 RX ORDER — TRANEXAMIC ACID 100 MG/ML
INJECTION, SOLUTION INTRAVENOUS
Status: COMPLETED
Start: 2021-06-03 | End: 2021-06-03

## 2021-06-03 RX ORDER — VITAMIN A ACETATE, BETA CAROTENE, ASCORBIC ACID, CHOLECALCIFEROL, .ALPHA.-TOCOPHEROL ACETATE, DL-, THIAMINE MONONITRATE, RIBOFLAVIN, NIACINAMIDE, PYRIDOXINE HYDROCHLORIDE, FOLIC ACID, CYANOCOBALAMIN, CALCIUM CARBONATE, FERROUS FUMARATE, ZINC OXIDE, CUPRIC OXIDE 3080; 12; 120; 400; 1; 1.84; 3; 20; 22; 920; 25; 200; 27; 10; 2 [IU]/1; UG/1; MG/1; [IU]/1; MG/1; MG/1; MG/1; MG/1; MG/1; [IU]/1; MG/1; MG/1; MG/1; MG/1; MG/1
1 TABLET, FILM COATED ORAL
Status: DISCONTINUED | OUTPATIENT
Start: 2021-06-03 | End: 2021-06-05 | Stop reason: HOSPADM

## 2021-06-03 RX ORDER — LIDOCAINE HYDROCHLORIDE 10 MG/ML
INJECTION, SOLUTION INFILTRATION; PERINEURAL
Status: ACTIVE
Start: 2021-06-03 | End: 2021-06-03

## 2021-06-03 RX ORDER — CARBOPROST TROMETHAMINE 250 UG/ML
250 INJECTION, SOLUTION INTRAMUSCULAR
Status: DISCONTINUED | OUTPATIENT
Start: 2021-06-03 | End: 2021-06-03 | Stop reason: HOSPADM

## 2021-06-03 RX ORDER — MISOPROSTOL 200 UG/1
TABLET ORAL
Status: COMPLETED
Start: 2021-06-03 | End: 2021-06-03

## 2021-06-03 RX ORDER — DOCUSATE SODIUM 100 MG/1
100 CAPSULE, LIQUID FILLED ORAL 2 TIMES DAILY PRN
Status: DISCONTINUED | OUTPATIENT
Start: 2021-06-03 | End: 2021-06-05 | Stop reason: HOSPADM

## 2021-06-03 RX ORDER — CEFAZOLIN SODIUM 2 G/100ML
2 INJECTION, SOLUTION INTRAVENOUS ONCE
Status: COMPLETED | OUTPATIENT
Start: 2021-06-03 | End: 2021-06-03

## 2021-06-03 RX ORDER — OXYCODONE HYDROCHLORIDE AND ACETAMINOPHEN 5; 325 MG/1; MG/1
1 TABLET ORAL EVERY 4 HOURS PRN
Status: DISCONTINUED | OUTPATIENT
Start: 2021-06-03 | End: 2021-06-05 | Stop reason: HOSPADM

## 2021-06-03 RX ADMIN — OXYTOCIN 20 UNITS: 10 INJECTION, SOLUTION INTRAMUSCULAR; INTRAVENOUS at 02:34

## 2021-06-03 RX ADMIN — LIDOCAINE HYDROCHLORIDE 20 ML: 10 INJECTION, SOLUTION INFILTRATION; PERINEURAL at 02:48

## 2021-06-03 RX ADMIN — CEFAZOLIN SODIUM 2 G: 2 INJECTION, SOLUTION INTRAVENOUS at 10:15

## 2021-06-03 RX ADMIN — TRANEXAMIC ACID 1000 MG: 100 INJECTION, SOLUTION INTRAVENOUS at 05:14

## 2021-06-03 RX ADMIN — SODIUM CHLORIDE 100 ML: 900 INJECTION INTRAVENOUS at 05:01

## 2021-06-03 RX ADMIN — FENTANYL CITRATE 100 MCG: 50 INJECTION, SOLUTION INTRAMUSCULAR; INTRAVENOUS at 02:48

## 2021-06-03 RX ADMIN — MISOPROSTOL 800 MCG: 200 TABLET ORAL at 02:45

## 2021-06-03 RX ADMIN — FENTANYL CITRATE 100 MCG: 50 INJECTION, SOLUTION INTRAMUSCULAR; INTRAVENOUS at 05:47

## 2021-06-03 RX ADMIN — OXYTOCIN 125 ML/HR: 10 INJECTION, SOLUTION INTRAMUSCULAR; INTRAVENOUS at 04:01

## 2021-06-03 RX ADMIN — METHYLERGONOVINE MALEATE 0.2 MG: 0.2 INJECTION, SOLUTION INTRAMUSCULAR; INTRAVENOUS at 06:02

## 2021-06-03 RX ADMIN — METHYLERGONOVINE MALEATE 0.2 MG: 0.2 INJECTION INTRAVENOUS at 06:02

## 2021-06-03 ASSESSMENT — FIBROSIS 4 INDEX: FIB4 SCORE: 0.28

## 2021-06-03 ASSESSMENT — LIFESTYLE VARIABLES
ALCOHOL_USE: NO
EVER_SMOKED: NEVER

## 2021-06-03 ASSESSMENT — PATIENT HEALTH QUESTIONNAIRE - PHQ9
2. FEELING DOWN, DEPRESSED, IRRITABLE, OR HOPELESS: NOT AT ALL
SUM OF ALL RESPONSES TO PHQ9 QUESTIONS 1 AND 2: 0
1. LITTLE INTEREST OR PLEASURE IN DOING THINGS: NOT AT ALL

## 2021-06-03 ASSESSMENT — PAIN DESCRIPTION - PAIN TYPE: TYPE: ACUTE PAIN

## 2021-06-03 NOTE — PROGRESS NOTES
Called to room after pt got up to bathroom and had 8 cm clot fall onto floor before she urinated. TXA was ordered at this time. After this she continued to have a light trickle and small clots with fundal rub and after weighing pads, the loss was 230cc. Methergine was then given and a bimanual massage was performed but pt had a very low tolerance to the exam despite fentanyl administration. Minimal amount of clots evacuated.     Close monitoring but bleeding is now light   Repeat CBC ordered   Ancef 2 grams given post bimanual.

## 2021-06-03 NOTE — L&D DELIVERY NOTE
Delivery Note    PATIENT ID:  NAME:  Thania Dias  MRN:               [id number]  YOB: 2000    Labor Course  Pt was admitted at 10cm and pushing spontaneously. She was GBS positive but did not have time to get treatment.     On 6/3/2021  at 02:28, this 20 y.o.,  38w0d now   , GBS positive female delivered via  under no anesthesia a viable female infant weight pending with APGAR scores of 8 and 9 at one and five minutes.  Baby to maternal abdomen.  Spontaneous cry.  Mouth and nares bulb suctioned by RN. Delayed cord clamping occurred with cord doubly clamped by myself and cut by FOB after pulsations had stopped.  Pitocin infusing in IVF.  Spontaneous delivery of placenta grossly intact @ 02:33.  CVx3. FF and bleeding small.  Upon vaginal exam, there was deeply posterior second degree perineal laceration which was repaired using 2.0 chromic in the usual sterile fashion with assistance via right angle retraction. Pt and infant are stable and bonding.  Lap count: correct.  Estimated blood loss: 300. Cytotec 800mcg placed rectally due to trickle after delivery of placenta.     SHILOH Lockwood Dr., attending physician

## 2021-06-03 NOTE — PROGRESS NOTES
0830 Assumed care from labor and delivery. In Chinese, oriented patient to room, call light, emergency light, TV, bed remote. Assessment completed, fundus firm, lochia light. Plan of care reviewed, verbalized understanding. Patient denies pain at this time, will call if pain med intervention needed.

## 2021-06-03 NOTE — PROGRESS NOTES
Pt presented with complaints of SROM at 0130 and contractions. Pt states she feels as if she needs to push.

## 2021-06-03 NOTE — PROGRESS NOTES
0142 - Pt taken to room 18, SROM at home and actively que.   0145 - SVE complete/+2. CNM Bronson called to bedside for delivery.  0228 -  of a viable baby girl, APGARS 8/9. Placenta delivered at 0233. EBL 300mls. See provider note for repair of 2nd degree perineal lac. Cytotec given at delivery.  0443 - SHILOH Silva notified of pts moderate bleeding with blood clots. TXA order obtained and started.   0520 - Pt up to restroom, large blood clot size of a sand dollar discharged onto floor when pt standsing. Pt with void x1. CNM Bronson called to bedside for exam. Pt with continued slow trickle bleeding.   0545 - SHILOH Silva at bedside for bimanual exam. Pt premedicated with fentanyl IV. Total EBL post delivery 230mls (measured loss) not counting that on the floor, with continue large amounts of small clots.   0600 - Methergine given IM.   0650 - Bedside report given to JUN Maynard

## 2021-06-03 NOTE — H&P
Obstetrics & Gynecology History & Physical Note    Date of Service  6/3/2021    Chief Complaint  Contractions starting and water breaking at 1:30am. Reports no bleeding and good fetal movement.     History of Presenting Illness  Thania Dias is a 20 y.o.  at 38w0d 2021, by Last Menstrual Period. Patient's last menstrual period was 09/10/2020. She is being admitted for labor management    Prenatal care is at [facility] and is complicated by:    Patient Active Problem List    Diagnosis Date Noted   • Group beta Strep positive 2021   • Size of fetus inconsistent with dates in third trimester 2021   • 33 weeks gestation of pregnancy 2021   • Cervical insufficiency during pregnancy in third trimester, antepartum 2021   • History of fetal demise, likely cervical insufficiency leading to infection and demise 2021       Obstetric History  OB History    Para Term  AB Living   2 0   0 1 0   SAB TAB Ectopic Molar Multiple Live Births   1       0 0      # Outcome Date GA Lbr Bran/2nd Weight Sex Delivery Anes PTL Lv   2 Current            1 SAB 07/10/20 18w3d  0.175 kg (6.2 oz) F Vag-Spont None Y ND      Complications: Chorioamnionitis       Gynecologic History  Gc/ct neg 2021    Review of Systems  ROS    Medical History   has no past medical history of Addisons disease ([facility]), Adrenal disorder ([facility]), Allergy, Anemia, Anxiety, Arrhythmia, Arthritis, Asthma, Blood transfusion without reported diagnosis, Cancer ([facility]), Cataract, CHF (congestive heart failure) ([facility]), Clotting disorder ([facility]), COPD (chronic obstructive pulmonary disease) ([facility]), Cushings syndrome ([facility]), Depression, Diabetes ([facility]), Diabetic neuropathy ([facility]), GERD (gastroesophageal reflux disease), Glaucoma, Goiter, Head ache, Heart attack (HCC), Heart murmur, HIV (human immunodeficiency virus infection) ([facility]), Hyperlipidemia, Hypertension, IBD (inflammatory bowel disease), Kidney disease, Meningitis,  "Migraine, Muscle disorder, Osteoporosis, Parathyroid disorder (HCC), Pituitary disease (HCC), Pulmonary emphysema (HCC), Seizure (HCC), Sickle cell disease (HCC), Stroke (HCC), Substance abuse (HCC), Thyroid disease, Tuberculosis, or Urinary tract infection.    Surgical History   has a past surgical history that includes open reduction.     Family History  family history includes No Known Problems in her brother, father, and mother.     Social History   reports that she has never smoked. She has never used smokeless tobacco. She reports previous alcohol use. She reports previous drug use. Drugs: Marijuana and Inhaled.    Allergies  Allergies   Allergen Reactions   • Asa [Aspirin] Shortness of Breath     \"can't breathe\"        Medications  Prior to Admission Medications   Prescriptions Last Dose Informant Patient Reported? Taking?   Prenatal Vit-Fe Fumarate-FA (PRENATAL PO)  Rx Bottle (For Med Information) Yes No   Sig: Take 1 Tab by mouth every day. From [facility]      Facility-Administered Medications: None       Physical Exam  Vitals:    06/03/21 0144 06/03/21 0159 06/03/21 0310   BP: 131/91  129/70   Pulse: 100  96   Resp: 16     Temp:  36.8 °C (98.3 °F)    TempSrc:  Temporal    SpO2: 100%     Weight: 104 kg (229 lb)     Height: 1.702 m (5' 7\")         General:   alert, cooperative, no distress   Skin:   normal   HEENT:  extraocular movements intact   Lungs:   clear to auscultation bilaterally   Heart:   regular rate and rhythm   Breasts:   deferred   Abdomen:  Abdomen soft, non-tender; gravid.   Pelvis: Exam deferred.   FHT: 130 BPM Fetal heart variability: moderate   [place]: External US   Presentations:   Vtx   Cervix:     Dilation: Complete    Effacement: 100    Station:       Consistency:      Position:         Laboratory:  Prenatal Results     General (Most Recent Result)     Test Value Reference Range Date Time    ABO  A   02/11/21 1431    Rh  POS   02/11/21 1431    Antibody screen  NEG   02/11/21 1431    " HbA1c        Chlamydia by PCR  Negative  Negative 02/11/21 1431    Gonorrhea by PCR  Negative  Negative 02/11/21 1431    RPR/Syphilus  Non-Reactive  Non-Reactive 04/02/21 1214    HSV 1/2 by PCR (non-serum)        HSV 1/2 (serum)        HSV 1        HSV 2        HPV (16)        HBsAg  Non-Reactive  Non-Reactive 02/11/21 1431    HIV-1 HIV-2 Antibodies  Non-Reactive  Non Reactive 02/11/21 1431    Rubella  46.00 IU/mL  02/11/21 1431    Tb              Pap Smear (Most Recent Result)     Test Value Reference Range Date Time    Pap smear        Pap smear w/HPV        Pap smear w/CTNG        Pap smar w/HPV CTNG        Pap smear (reflex HPV ACUS)        Pap smear (reflex HPV ASCUS w/CTNG)        Pathology gyn specimen              Urinalysis (Most Recent Result)     Test Value Reference Range Date Time    Urinalysis  (See Report)    02/24/21 1545    POC urinalysis  (See Report)    02/16/21 1556    Urine drug screen (w/o conf)        Urine culture ([id number])        Urine culture ([id number])  (See Report)    02/11/21 1431    Urine Protein/Creatinine Ratio              Urinalysis, Culture if indicated     Test Value Reference Range Date Time    Color  Yellow   02/24/21 1545    Appearance  Clear   02/24/21 1545    Specific Gravity  1.015  <1.035 02/24/21 1545    PH  7.5  5.0 - 8.0 02/24/21 1545    Glucose  Negative mg/dL Negative 02/24/21 1545    Ketones  Negative mg/dL Negative 02/24/21 1545    Protein  Negative mg/dL Negative 02/24/21 1545    Bilirubin  Negative  Negative 02/24/21 1545    Nitrites  Negative  Negative 02/24/21 1545    Leukocytes Esterase  Negative  Negative 02/24/21 1545    Blood  Negative  Negative 02/24/21 1545    Comment  see below   02/24/21 1545    Culture              Urine Drug Screen     Test Value Reference Range Date Time    Amphetamines        Barbiturates  Negative ng/mL Cutoff 200 02/11/21 1427    Benzodiazepines  Negative ng/mL Cutoff 200 02/11/21 1427    Cocaine  Negative ng/mL Cutoff 150  21 1427    Methadone  Negative ng/mL Cutoff 150 21 1427    Opiates        Oxycodone        Phencylidine  Negative ng/mL Cutoff 25 21 1427    Propoxyphene        Marijuana Metabolite  Negative ng/mL Cutoff 20 21 1427          1st Trimester     Test Value Reference Range Date Time    Hgb        Hct        Fasting Glucose Tolerance        GTT, 1 hour        GTT, 2 hours        GTT, 3 hours              2nd Trimester     Test Value Reference Range Date Time    Hgb  13.2 g/dL 12.0 - 16.0 21 1530       12.6 g/dL 12.0 - 16.0 21 1431    Hct  39.3 % 37.0 - 47.0 21 1530       37.5 % 37.0 - 47.0 21 1431    AST  13 U/L 12 - 45 21 1530    ALT  10 U/L 2 - 50 21 1530    Uric Acid        Fasting Glucose Tolerance        GTT, 1 hour  105 mg/dL 70 - 139 21 1558    GTT, 2 hours        GTT, 3 hours              3rd Trimester     Test Value Reference Range Date Time    Hgb  12.0 g/dL 12.0 - 16.0 21 0145       12.5 g/dL 12.0 - 16.0 21 1214    Hct  36.1 % 37.0 - 47.0 21 0145       38.3 % 37.0 - 47.0 21 1214    Platelet count  259 K/uL 164 - 446 21 0145       279 K/uL 164 - 446 21 1214    GBS (JOSEPH BROTH)  POSITIVE  Negative 21 0858    Fasting Glucose Tolerance        GTT, 1 hour        GTT, 2 hous        GTT, 3hours              Congenital Disease Screening     Test Value Reference Range Date Time    First Trimester Screen        Quad Screen        BH Electrophoresis        Cystic Fibrosis Carrier Study        SMA        AFP Maternal Serum         AFP Tetra  (See Report)    21 1431    NIPT              Legend    ^: Historical                          Urinalysis:    No results found     Imaging:  No orders to display         Assessment:  20 y.o.  38w0d who presents with  Labor and delivery indication for care or intervention [O75.9]  Labor and delivery, indication for care [O75.9]    Plan:  No new Assessment & Plan notes  have been filed under this hospital service since the last note was generated.  Service: Obstetrics & Gynecology    1. Admit to L&D  2. GBS: positive but pt spontaneously pushing at time of admission     VTE prophylaxis: n/a    OLIVERIO Roberson.

## 2021-06-03 NOTE — PROGRESS NOTES
0700- Report received. POC discussed. Pt resting in bed. Denies any pain. Breakfast ordered.   Infant found to be satting in 80s. Blowby started and NBN called. Infant transferred to [facility]. Mother educated.     0745- ABX ordered. Waiting for pharmacy.     0800- Pt up to bathroom and able to void again. Pt transferred to PP unit ad bedside report given to Delphine BARAHONA.

## 2021-06-04 LAB
ERYTHROCYTE [DISTWIDTH] IN BLOOD BY AUTOMATED COUNT: 45.7 FL (ref 35.9–50)
HCT VFR BLD AUTO: 31.2 % (ref 37–47)
HGB BLD-MCNC: 10.8 G/DL (ref 12–16)
MCH RBC QN AUTO: 30.7 PG (ref 27–33)
MCHC RBC AUTO-ENTMCNC: 34.6 G/DL (ref 33.6–35)
MCV RBC AUTO: 88.6 FL (ref 81.4–97.8)
PLATELET # BLD AUTO: 222 K/UL (ref 164–446)
PMV BLD AUTO: 12 FL (ref 9–12.9)
RBC # BLD AUTO: 3.52 M/UL (ref 4.2–5.4)
WBC # BLD AUTO: 9.1 K/UL (ref 4.8–10.8)

## 2021-06-04 PROCEDURE — 36415 COLL VENOUS BLD VENIPUNCTURE: CPT

## 2021-06-04 PROCEDURE — A9270 NON-COVERED ITEM OR SERVICE: HCPCS | Performed by: NURSE PRACTITIONER

## 2021-06-04 PROCEDURE — 85027 COMPLETE CBC AUTOMATED: CPT

## 2021-06-04 PROCEDURE — 700102 HCHG RX REV CODE 250 W/ 637 OVERRIDE(OP): Performed by: NURSE PRACTITIONER

## 2021-06-04 PROCEDURE — 770002 HCHG ROOM/CARE - OB PRIVATE (112)

## 2021-06-04 RX ADMIN — PRENATAL WITH FERROUS FUM AND FOLIC ACID 1 TABLET: 3080; 920; 120; 400; 22; 1.84; 3; 20; 10; 1; 12; 200; 27; 25; 2 TABLET ORAL at 09:12

## 2021-06-04 ASSESSMENT — PAIN DESCRIPTION - PAIN TYPE
TYPE: ACUTE PAIN

## 2021-06-04 NOTE — PROGRESS NOTES
Obstetrics & Gynecology Post-Delivery Progress Note    Date of Service      20 y.o.  s/p vaginal, spontaneous  Delivery date: 6/3/21    Events  8 cm blood clot passed following delivery    Subjective  Pain: Yes,  controlled  Bleeding: lochia minimal  Tolerating PO: yes  Voiding: without difficulty  Ambulating: yes  Passing flatus: Yes  Feeding: breastfeeding well    Objective  24hr VS:  Temp:  [36.4 °C (97.6 °F)-37.3 °C (99.1 °F)] 36.4 °C (97.6 °F)  Pulse:  [] 82  Resp:  [18-19] 19  BP: (116-132)/(60-94) 126/76  SpO2:  [95 %-97 %] 95 %    Physical Exam  General: well  Chest/Breasts: nipples intact   Abdomen: normal bowel sounds, soft  Fundus: firm and below umbilicus  Incision: not applicable, (vaginal delivery)  Perineum: deferred  Extremities: symmetric and no edema, calves nontender    Labs:  Hgb 10.6    Medications  prenatal plus vitamin, 1 tablet, Oral, Daily-0800      acetaminophen, oxyCODONE-acetaminophen, oxyCODONE-acetaminophen, LR, oxytocin, misoprostol, docusate sodium, bisacodyl, magnesium hydroxide      Assessment/Plan  Thania Dias is a 20 y.o.yo  s/p postpartum day #1  s/p vaginal, spontaneous    - Post care: meeting all goals  - GBS+, did not receive PCN  - Pain: controlled  - Rh+, Rubella Immune  - Method of Feeding: plans to breastfeed  - Method of Contraception: undecided  VTE prophylaxis: none indicated    - Disposition: likely home postpartum day 2

## 2021-06-04 NOTE — CARE PLAN
The patient is Stable - Low risk of patient condition declining or worsening    Shift Goals  Clinical Goals: Ambulating more with continued pain control  Patient Goals: Breastfeeding with pumping   Family Goals: Bonding with infant    Progress made toward(s) clinical / shift goals:  Reports absence of pain, ambulating to bathroom and around room, demonstrates use of breast pump correctly, working with lactation asks for assistance with pumping and infant care as needed. Parents bonding with infant and providing care needs.    Patient is not progressing towards the following goals:NA

## 2021-06-04 NOTE — PROGRESS NOTES
[id number] Bedside report received from Constanza De Dios RN Reviewed plan of care, patient requests  medications  for pain management by request. Without current questions or concerns will call as needed for assistance.    Chart check completed

## 2021-06-04 NOTE — PROGRESS NOTES
2020 Assessment complete. VSS. Fundus firm, lochia light. Pt will call to request pain medications. FOB at bedside bonding with pt and baby. States voiding without difficulty. POC discussed. Encouraged to call with need. Call light in place

## 2021-06-04 NOTE — DISCHARGE PLANNING
Discharge Planning Assessment Post Partum    Reason for Referral:  Consult-history of THC and a fetal demise at 18 weeks  Address: [facility] [address] [place] [address]  Phone: [phone]  Type of Living Situation: living with FOB  Mom Diagnosis: Pregnancy  Baby Diagnosis: [place]-38 weeks  Primary Language: Romansh speaking-used  Selene #[id number]     Name of Baby: Aimee (: 6/3/21)  Father of the Baby: Franklyn Guan  Involved in baby’s care? Yes  Contact Information: [phone]    Prenatal Care: Yes  Mom's PCP: None  PCP for new baby: Pediatrician list provided    Support System: FOB  Coping/Bonding between mother & baby: Yes  Source of Feeding: breast feeding  Supplies for Infant: prepared for infant    Mom's Insurance: [place] Medicaid  Baby Covered on Insurance:Yes  Mother Employed/School: Not currently  Other children in the home/names & ages: Parents had a fetal demise at 18 weeks in 2020.    Financial Hardship/Income: No   Mom's Mental status: alert and oriented  Services used prior to admit: Medicaid    CPS History: No  Psychiatric History: No  Domestic Violence History: No  Drug/ETOH History: history of THC-infant's UDS is negative    Resources Provided: pediatrician list, children and family resource list, post partum support and counseling resources, and a list of [facility] clinics provided to mother  Referrals Made: diaper bank referral provided     Clearance for Discharge: Infant is cleared to discharge home with parents

## 2021-06-04 NOTE — LACTATION NOTE
This note was copied from a baby's chart.  MOB is Belarusian speaking used ipad  [employer] #[id number]. Baby 38 weeks, , MOB Hx teen pregnancy (20 years old). Breast assessment done- see latch assessment. MOB has been working a 3 step plan:    Breastfeeding 3 step plan:  1. Breastfeed  2. Supplement (according to guideline volumes)  3. Pump & hand express  Every 3-4 hours or sooner if baby cues (minimum 8 feedings in 24 hours)    MOB has pacifier in baby's mouth, baby swaddled asleep in crib (sucking on pacifier). Discussed with mother risks of pacifier use, encouraged mother to limit use. MOB reports it is time for baby to feed. Asked mother if I could help her latch baby, she agreed. LC assisted baby to left breast, baby attempted to latch, few sucks then fell asleep. MOB had 0.5 ml's of colostrum in syringe she had expressed earlier, syringe fed back then mother bottle fed 15 ml's of Similac. LC assisted mother with pumping, settings reviewed. Flange 22 mm given, speed 80 decrease to 60 after 2 minutes, suction 35% x 15 minutes then hand express x 2 minutes each breast. Storage & Prep of , CDC- given in [place]ian. Discussed with mother importance of following 3 step plan to protect milk supply. BAEN provided demo on hand expression.

## 2021-06-04 NOTE — CARE PLAN
The patient is Stable - Low risk of patient condition declining or worsening    Shift Goals  Clinical Goals: Light bleeding    Problem: Psychosocial - Postpartum  Goal: Patient will verbalize and demonstrate effective bonding and parenting behavior  Outcome: Progressing     Problem: Altered Physiologic Condition  Goal: Patient physiologically stable as evidenced by normal lochia, palpable uterine involution and vitals within normal limits  Outcome: Progressing  Note: VSS. Fundus firm and lochia light.

## 2021-06-05 VITALS
HEART RATE: 91 BPM | RESPIRATION RATE: 18 BRPM | HEIGHT: 67 IN | BODY MASS INDEX: 35.94 KG/M2 | TEMPERATURE: 96.9 F | DIASTOLIC BLOOD PRESSURE: 79 MMHG | SYSTOLIC BLOOD PRESSURE: 109 MMHG | WEIGHT: 229 LBS | OXYGEN SATURATION: 98 %

## 2021-06-05 PROCEDURE — 700102 HCHG RX REV CODE 250 W/ 637 OVERRIDE(OP): Performed by: NURSE PRACTITIONER

## 2021-06-05 PROCEDURE — A9270 NON-COVERED ITEM OR SERVICE: HCPCS | Performed by: NURSE PRACTITIONER

## 2021-06-05 RX ADMIN — PRENATAL WITH FERROUS FUM AND FOLIC ACID 1 TABLET: 3080; 920; 120; 400; 22; 1.84; 3; 20; 10; 1; 12; 200; 27; 25; 2 TABLET ORAL at 09:39

## 2021-06-05 ASSESSMENT — EDINBURGH POSTNATAL DEPRESSION SCALE (EPDS)
THE THOUGHT OF HARMING MYSELF HAS OCCURRED TO ME: NEVER
I HAVE LOOKED FORWARD WITH ENJOYMENT TO THINGS: AS MUCH AS I EVER DID
I HAVE BEEN ABLE TO LAUGH AND SEE THE FUNNY SIDE OF THINGS: AS MUCH AS I ALWAYS COULD
I HAVE BLAMED MYSELF UNNECESSARILY WHEN THINGS WENT WRONG: NO, NEVER
I HAVE FELT SCARED OR PANICKY FOR NO GOOD REASON: NO, NOT AT ALL
I HAVE BEEN SO UNHAPPY THAT I HAVE BEEN CRYING: NO, NEVER
I HAVE BEEN SO UNHAPPY THAT I HAVE HAD DIFFICULTY SLEEPING: NOT AT ALL
I HAVE BEEN ANXIOUS OR WORRIED FOR NO GOOD REASON: NO, NOT AT ALL
THINGS HAVE BEEN GETTING ON TOP OF ME: NO, I HAVE BEEN COPING AS WELL AS EVER
I HAVE FELT SAD OR MISERABLE: NO, NOT AT ALL

## 2021-06-05 NOTE — CARE PLAN
The patient is Stable - Low risk of patient condition declining or worsening    Shift Goals  Clinical Goals: Patient will ambulate in halls twice during day shift  Patient Goals: Breastfeeding with pumping   Family Goals: Bonding with infant    Progress made toward(s) clinical / shift goals:  Patient ambulated in [place] prior to discharge.    Patient is not progressing towards the following goals: N/A

## 2021-06-05 NOTE — CARE PLAN
The patient is Stable - Low risk of patient condition declining or worsening    Shift Goals  Clinical Goals: Lochia light. Pain WDL  Patient Goals:   Family Goals:     Progress made toward(s) clinical / shift goals: VSS. Fundus firm with light lochia. Patient denies needing pharmacological intervention.     Patient is not progressing towards the following goals:

## 2021-06-05 NOTE — PROGRESS NOTES
ID bands verified by this RN. Discharge instructions reviewed with patient and signed by patient. Follow up appointments reviewed with patient. All questions addressed at this time. Instructed to press call light when infant strapped in car seat for car seat check.

## 2021-06-05 NOTE — DISCHARGE SUMMARY
POSTPARTUM    PROGRESS  NOTE;    PATIENT ID:  NAME:  Thania Dias  MRN:               [id number]  YOB: 2000    Admission date; 6/3/2021  Discharge date; 2021     20 y.o. female  at 38w0d PPD#2 s/p     Subjective: Doing well    Objective:    Vitals:    21 2200 21 0200 21 0600 21 1800   BP: 119/70 116/73 126/76 136/85   Pulse: 98 97 82 91   Resp: 18 18    Temp: 36.7 °C (98 °F) 36.6 °C (97.8 °F) 36.4 °C (97.6 °F) 36.5 °C (97.7 °F)   TempSrc: Temporal Temporal Temporal Temporal   SpO2: 97% 96% 95% 95%   Weight:       Height:         General: No acute distress, resting comfortably in bed.  HEENT: normocephalic, nontraumatic, PERRLA, EOMI  Cardiovascular: Heart RRR with no murmurs, rubs or gallops. Distal Pulses 2+  Respiratory: symmetric chest expansion, lungs CTA bilaterally with no wheezes rales or rhonci  Abdomen: soft, mildly tender, fundus firm, +BS  Genitourinary: lochia light, denies excessive vaginal bleeding  Musculoskeletal: strength 5/5 in four extremities  Neuro: non focal with no numbness, tingling or changes in sensation    Recent Labs     21  0145 21  0822 21  0202   WBC 9.1 13.8* 9.1   RBC 4.10* 3.97* 3.52*   HEMOGLOBIN 12.0 11.5* 10.8*   HEMATOCRIT 36.1* 35.1* 31.2*   MCV 88.0 88.4 88.6   MCH 29.3 29.0 30.7   RDW 44.5 45.2 45.7   PLATELETCT 259 252 222   MPV 12.0 12.5 12.0   NEUTSPOLYS 70.70  --   --    LYMPHOCYTES 20.70*  --   --    MONOCYTES 7.80  --   --    EOSINOPHILS 0.20  --   --    BASOPHILS 0.10  --   --      No results for input(s): SODIUM, POTASSIUM, CHLORIDE, CO2, GLUCOSE, BUN, CPKTOTAL in the last 72 hours.    Current Meds:   Current Facility-Administered Medications   Medication Dose Frequency Provider Last Rate Last Admin   • oxytocin (PITOCIN) infusion (for postpartum)   mL/hr Continuous LEONELA RobersonPSeymourRSeymourN. 250 mL/hr at 21 0550 250 mL/hr at 21 0550   • acetaminophen (Tylenol)  tablet 325 mg  325 mg Q4HRS PRN Elsa Silva, A.P.R.N.       • oxyCODONE-acetaminophen (PERCOCET) 5-325 MG per tablet 1 tablet  1 tablet Q4HRS PRN Elsa Silva, A.P.R.N.       • oxyCODONE-acetaminophen (PERCOCET-10)  MG per tablet 1 tablet  1 tablet Q4HRS PRN Elsa Silva, A.P.R.N.       • LR infusion   PRN Elsa Silva, A.P.R.N.       • PRN oxytocin (PITOCIN) (20 Units/1000 mL) PRN for excessive uterine bleeding - See Admin Instr  125-999 mL/hr Once PRN Elsa Silva, A.P.R.N.       • miSOPROStol (CYTOTEC) tablet 600 mcg  600 mcg Once PRN Elsa Silva, A.P.R.N.       • docusate sodium (COLACE) capsule 100 mg  100 mg BID PRN Elsa Silva, A.P.R.N.       • bisacodyl (DULCOLAX) suppository 10 mg  10 mg PRN Elsa Silva, A.P.R.N.       • magnesium hydroxide (MILK OF MAGNESIA) suspension 30 mL  30 mL Q6HRS PRN Elsa Silva, A.P.R.N.       • prenatal plus vitamin (STUARTNATAL 1+1) 27-1 MG tablet 1 tablet  1 tablet Daily-0800 Elsa Silva, A.P.R.N.   1 tablet at 21 0912   Last reviewed on 6/3/2021  3:40 AM by Renetta Bishop R.N.       Assessment:  20 y.o. female  at 38w0d PPD#2 s/p     Plan:   1. Home  2. Discharge day follow-up in 6 weeks and postpartum clinic      Jason Mercado MD

## 2021-06-05 NOTE — DISCHARGE INSTRUCTIONS
DISCHARGE INSTRUCTIONS (Romansh) POSTPARTUM FOR MOM      HAIM LAS DAPHNEY:  · Antes de tocar el bebé.  · Antes del amamantamiento o darle al bebé lactancia artificial.  · Después de usar el baño.    CUIDADO DE LAS HERIDAS:  · La Incisión de la Operación Cesárea:  Mantenga limpea y seca, NO levante nada que pesa más que archer bebé por 6 semenas.  No debe ninguna apertura ni pus.  · Episiotomía/Rafa Vaginal:  Use un spray de Tucks o Dermoplast, tome un baño de asiento cuando necesite (6 pulgadas), siga usando la botella Madeleine hasta que pare de sangrar.    CUIDADA DEL SENO:  · Lleve un sostén.  · Si esta` amamantando no use jabón en el seno ni en los pezones.  · Aplique lanolina si los pezones se ponen quebrazados y si le duelen.    CUIDADO DE LA VAGINA:  · Cupertino puede entrar la vagina por 6 semanas:  Actividad sexual, Ducha vaginal, Tampones.  · El sangramiento puede seguir por 2 a 4 semanas.  La cantidad es variable.  Llame a archer med`ico(a) obstetra si hay mucha linda (si usa ma`s de larissa toalla femenina cada hora).    CONTRACEPCIÒN:  · Es posible embarazarse en cualquier tiempo despus del parto y mientras el amamantamiento.  · Debe planear de discutir los metodos de cantracepción con archer médico(a) cuando vuelva en 6 semanas para archer revisión médica.    DIETA Y DEFECACÒN:  · Para evitar la constipación coma mas fibra (cereal salvado, fruta, y verduras) Y tome muchos liquidos.   · Es común orinar frecuentemente después del parto.    POSTPARTO Y LA DEPRESÒN:  Malachi los primeros gaspar después del parto es común sentirse:  · Impaciente, irritable, o llorar  Estos sentimientos llegan y van rapidamente.  No obstante, kevin larissa de cada shanique mujeres tiene síntomas emocionales conocidos jeff depresión postparto.  · Despresión Postparto:  Puede empezar tan pronto jeff el andrea o tercer día después del parto o puede durar algunas semanas o meses para desarrollar.  Síntomas de la depresión pueden presentarse, reji son más  intensos:  · Pérdida del hambre  · Frecuencia de llorar  · Sentirse desesperada o perder el control  · Demasiada o no suficiente preocupación con archer bebé  · Tener miedo de tocar el bebé  · No preocuparse con archer propia apariencia  · Incapacidad de dormir o dormir excesivamente    DEPRESIÒN / RIESGO AL SUICIDIO:    Cuando se le da de tyler de alguna entidad de [facility], es importante aprender a mantener a mohamud de hacerse daño a sí mismo.    Reconocer los signos de advertencia:  · Cambios bruscos en la peronalidad, positiva o negativa, incluyendo aumento de la energia  · Regalar posesiones  · Cambios en patrones de comer - significativos cambios de peso - positivos o negativos  · Cambio de patrones para dormir - no poder dormir o dormir todo el tiempo  · Falta de voluntad o incapacidad de comunicarse  · Depresión  · Marni, desánimo y tristeza inusual  · Habla de querer morir  · Descuido del aspecto personal  · Rebeldía - comportamiento imprudente  · Retiro de personas y actividades que les gusta  · Confusión-incapacidad para concentrarse    Si usted o un ser querido observa cualquiera de estos comportamientos o tiene preocupaciones de hacerse daño a si mismo, aquí le damos lo que usted puede hacer:  · Hablar de ti - tus sentimientos y razones para hacerse gina a si mismo  · Retire cualquier medio que se podría utilizar para lastimarse (ejemplos: píldoras, cuerdas, cordones de extensión, arma de sunita)  · Obtenga ayuda profesional de la comunidad (marlin mental, abuso de sustancias, orientación psicológica)  · No estar solo: llamar a un contacto seguro - larissa persona que confía en que estará allí por usted  · Llamada local LINEA de CRISIS [phone] y [phone]  · Llamada local [employer] [facility] [place] [phone] or www.GetThis.com  · Llamada gratuita nacional, líneas de prevención del suicidio  · Preventión del Suicidio Nacional [facility] [phone]  ([phone])  · Línea Nacional de la [place] [employer] [place] 800-SUICIDE ([phone])       ([employer])

## 2021-06-05 NOTE — PROGRESS NOTES
Received bedside report from JUN Abbott. Patient in bed, declines pain at this time and states she will call when needing pain medication. Whiteboards updated, POC discussed. Patient denies any heavy bleeding. Call light within reach. Patient encouraged to call with any needs and or concerns.

## 2021-06-05 NOTE — LACTATION NOTE
This note was copied from a baby's chart.  @[id number] LC spoke with MOB with assistance from language line/IPad  Saba #[id number] for follow-up visit, MOB states she has been both breast and formula feeding, she states she has been giving 20 ml Similac at each feeding, baby was 38 weeks gestation at delivery, birth weight was 7 # 15.2 oz, current weight loss is 7.18% (weight loss was 4.99% the previous night), baby has been voiding and stooling, education provided on breastfeeding and supplementation, encouraged frequent breastfeeding, encouraged ad priya breastfeeding at least Q 4 hours (more often if feeding cues noted), if supplementing encouraged to offer small amounts of formula per hospital supplement guidelines after first offering the breast    Supplement guidelines provided and explained    MOB declined LC offer for latch assistance at this time    MOB has Medicaid, educated on assistance available at [facility] after discharge, WIC contact information provided and MOB instructed to call during the week when WIC is open if she decides she would like to attempt to establish care    MOB denies having any additional questions or concerns for LC at this time    Encouraged to call for assistance as needed

## 2021-06-05 NOTE — PROGRESS NOTES
0700 - Bedside report received from Tru ROGERS RN. Patient care assumed. Chart, prenatal labs, and orders reviewed  0945 - Pt assessment complete, wnl. Fundus firm with minimal discharge. Pt ambulating to bathroom and voiding without difficulty. Plan of care discussed with patient for the day including infant feeding every 2-3 hours or on demand, pain management, and ambulation in halls. Pain medication plan discussed with patient; patient states she will call if PRN pain medication is wanted. All questions/concerns addressed at this time. Call light within reach, encouraged to call with needs. Will continue with routine postpartum cares.

## 2021-07-06 ENCOUNTER — POST PARTUM (OUTPATIENT)
Dept: OBGYN | Facility: CLINIC | Age: 21
End: 2021-07-06
Payer: MEDICAID

## 2021-07-06 VITALS — BODY MASS INDEX: 33.52 KG/M2 | SYSTOLIC BLOOD PRESSURE: 120 MMHG | WEIGHT: 214 LBS | DIASTOLIC BLOOD PRESSURE: 64 MMHG

## 2021-07-06 DIAGNOSIS — Z30.42 ENCOUNTER FOR DEPO-PROVERA CONTRACEPTION: ICD-10-CM

## 2021-07-06 DIAGNOSIS — Z30.09 UNWANTED FERTILITY: ICD-10-CM

## 2021-07-06 DIAGNOSIS — Z32.02 PREGNANCY EXAMINATION OR TEST, NEGATIVE RESULT: ICD-10-CM

## 2021-07-06 DIAGNOSIS — Z76.89 ENCOUNTER TO ESTABLISH CARE WITH NEW DOCTOR: ICD-10-CM

## 2021-07-06 LAB
INT CON NEG: NEGATIVE
INT CON POS: POSITIVE
POC URINE PREGNANCY TEST: NEGATIVE

## 2021-07-06 PROCEDURE — 0503F POSTPARTUM CARE VISIT: CPT | Mod: 25 | Performed by: NURSE PRACTITIONER

## 2021-07-06 PROCEDURE — 81025 URINE PREGNANCY TEST: CPT | Performed by: NURSE PRACTITIONER

## 2021-07-06 PROCEDURE — 96372 THER/PROPH/DIAG INJ SC/IM: CPT | Performed by: NURSE PRACTITIONER

## 2021-07-06 RX ORDER — MEDROXYPROGESTERONE ACETATE 150 MG/ML
150 INJECTION, SUSPENSION INTRAMUSCULAR
OUTPATIENT
Start: 2021-07-06

## 2021-07-06 RX ADMIN — MEDROXYPROGESTERONE ACETATE 150 MG: 150 INJECTION, SUSPENSION INTRAMUSCULAR at 15:12

## 2021-07-06 ASSESSMENT — EDINBURGH POSTNATAL DEPRESSION SCALE (EPDS)
I HAVE BEEN ANXIOUS OR WORRIED FOR NO GOOD REASON: NO, NOT AT ALL
I HAVE LOOKED FORWARD WITH ENJOYMENT TO THINGS: AS MUCH AS I EVER DID
I HAVE FELT SAD OR MISERABLE: NO, NOT AT ALL
THE THOUGHT OF HARMING MYSELF HAS OCCURRED TO ME: NEVER
I HAVE BEEN SO UNHAPPY THAT I HAVE BEEN CRYING: NO, NEVER
I HAVE FELT SCARED OR PANICKY FOR NO GOOD REASON: NO, NOT AT ALL
I HAVE BLAMED MYSELF UNNECESSARILY WHEN THINGS WENT WRONG: NO, NEVER
TOTAL SCORE: 0
I HAVE BEEN SO UNHAPPY THAT I HAVE HAD DIFFICULTY SLEEPING: NOT AT ALL
I HAVE BEEN ABLE TO LAUGH AND SEE THE FUNNY SIDE OF THINGS: AS MUCH AS I ALWAYS COULD
THINGS HAVE BEEN GETTING ON TOP OF ME: NO, I HAVE BEEN COPING AS WELL AS EVER

## 2021-07-06 ASSESSMENT — ENCOUNTER SYMPTOMS
GASTROINTESTINAL NEGATIVE: 1
CARDIOVASCULAR NEGATIVE: 1
RESPIRATORY NEGATIVE: 1
MUSCULOSKELETAL NEGATIVE: 1
CONSTITUTIONAL NEGATIVE: 1
PSYCHIATRIC NEGATIVE: 1
NEUROLOGICAL NEGATIVE: 1
EYES NEGATIVE: 1

## 2021-07-06 ASSESSMENT — FIBROSIS 4 INDEX: FIB4 SCORE: 0.35

## 2021-07-06 NOTE — NON-PROVIDER
Subjective:    Thania Dias is a 20 y.o. female who presents for her postpartum exam 5 weeks following  on 6/3/2021 with a 2nd degree laceration and repaired. Her prenatal course was complicated with GBS positive, cervical insufficiency, size of fetus inconsistent with dates in 3rd trimester. She denies dysuria, vaginal bleeding, odor, itching or breast problems. She is formula feeding. She desires Depo injection for her birth control method. Reports protected sex with a condom prior to this appointment. Eating a regular diet without difficulty. Bowel movement are Normal.  The patient is not having any pain. No longer bleeding. Patient denies postpartum depression or anxiety. Pt states she wishes to establish care with a PCP.     Review of Systems   Constitutional: Negative.    Respiratory: Negative.    Cardiovascular: Negative.    Gastrointestinal: Negative.    Genitourinary: Negative.    Skin: Negative.    Psychiatric/Behavioral: Negative.        Problem List     Patient Active Problem List    Diagnosis Date Noted   • Group beta Strep positive 2021   • Size of fetus inconsistent with dates in third trimester 2021   • 33 weeks gestation of pregnancy 2021   • Cervical insufficiency during pregnancy in third trimester, antepartum 2021   • History of fetal demise, likely cervical insufficiency leading to infection and demise 2021       Objective    See PE    Physical Exam  Constitutional:       Appearance: Normal appearance. She is normal weight.   Neck:      Thyroid: No thyroid mass, thyromegaly or thyroid tenderness.   Cardiovascular:      Rate and Rhythm: Normal rate and regular rhythm.      Pulses: Normal pulses.      Heart sounds: Normal heart sounds.   Pulmonary:      Effort: Pulmonary effort is normal.      Breath sounds: Normal breath sounds.   Chest:      Breasts:         Right: Normal.         Left: Normal.   Abdominal:      General: Abdomen is flat. Bowel  sounds are normal.      Palpations: Abdomen is soft.   Musculoskeletal:      Cervical back: Normal range of motion and neck supple.   Skin:     General: Skin is warm and dry.   Neurological:      Mental Status: She is alert and oriented to person, place, and time. Mental status is at baseline.   Psychiatric:         Mood and Affect: Mood normal.         Behavior: Behavior normal.         Thought Content: Thought content normal.         Judgment: Judgment normal.       Lab: H&H at d/c:   /64   Wt 97.1 kg (214 lb)   LMP 09/10/2020   BMI 33.52 kg/m²     Assessment:    1. PP care of lactating women  2. Exam WNL   3. Desires contraception: depo injection      Plan:    1. Breastfeeding support: still lactating but not BF, encouraged to use sport bras as needed    2. Continue PNV   3. Contraceptive counseling - start 1st Depo injection today and f/u [id number] for injection  4. Encouraged condom use for 7 days or no sex  5. Discussed diet, exercise and resumption of sexual activity   6. Preconception guidance for next pregnancy if applicable. PTL, cervical insufficiency risk factors. If pregnant again, come see us in clinic asap after knowing pregnancy status. Folic acid for all women of childbearing age.   7. Referral to establish PCP.

## 2021-07-06 NOTE — PROGRESS NOTES
Pt here today for postpartum exam.  Delivery Date: 2021  Formula feeding only  BCM: unsure, would like to talk about her options  LMP: not yet   WT: 214 lb  BP:  120/64  Pt states no complaints or concerns today  Pt states she became sexually active about 1 week ago, states she used condoms.   EPDS Score: (0)  Good ph: [phone]  Negative UPT today, done in clinic    Per provider's orders Depo-Provera injection administered today 2021. Consent signed  NDC: 3226-9467-83  LOT#: [id number]  Expiration Date: 2022  Dose: 150 mg  Site: Right Deltoid  Patient educated on use and side effects of medication. Name and  verified prior to injection. Pt tolerated? Yes  Verified by Kathe CLINTON)  Administered by Leonidas Chacko Ass't at 3:12 PM.  Patient Provided Medication: No    Next Depo-Provera injection will be due on  - Oct 5 ()  Pt informed and reminder card given

## 2021-07-06 NOTE — PROGRESS NOTES
Subjective:    Thania Dias is a 20 y.o. female who presents for her postpartum exam 4 weeks following  on 6/3 with a 2nd degree laceration. Her prenatal course was complicated by shortened cervix versus threatened PTL requiring inpatient stay from  and discharged on 3/15. She did receive a course of BMZ.   GBS positive status. She denies dysuria, vaginal bleeding, odor, itching or breast problems. She is bottlefeeding formula. She desires an depo provera for her birth control method. Reports protected sex with a condom prior to this appointment. Eating a regular diet without difficulty. Bowel movement are Normal.  The patient is not having any pain. No longer bleeding. . Patient denies anxiety or  postpartum depression.   She does want to establish care with PCP.   Review of Systems   Constitutional: Negative.    HENT: Negative.    Eyes: Negative.    Respiratory: Negative.    Cardiovascular: Negative.    Gastrointestinal: Negative.    Genitourinary: Negative.    Musculoskeletal: Negative.    Skin: Negative.    Neurological: Negative.    Endo/Heme/Allergies: Negative.    Psychiatric/Behavioral: Negative.    All other systems reviewed and are negative.      Problem List     Patient Active Problem List    Diagnosis Date Noted   • Group beta Strep positive 2021   • Size of fetus inconsistent with dates in third trimester 2021   • 33 weeks gestation of pregnancy 2021   • Cervical insufficiency during pregnancy in third trimester, antepartum 2021   • History of fetal demise, likely cervical insufficiency leading to infection and demise 2021       Objective  Physical Exam  Vitals and nursing note reviewed.   Constitutional:       Appearance: Normal appearance. She is normal weight.   HENT:      Head: Normocephalic and atraumatic.      Right Ear: External ear normal.      Left Ear: External ear normal.      Nose: Nose normal.      Mouth/Throat:      Mouth: Mucous  membranes are moist.      Pharynx: Oropharynx is clear.   Eyes:      Conjunctiva/sclera: Conjunctivae normal.      Pupils: Pupils are equal, round, and reactive to light.   Cardiovascular:      Rate and Rhythm: Normal rate and regular rhythm.      Pulses: Normal pulses.      Heart sounds: Normal heart sounds.   Pulmonary:      Effort: Pulmonary effort is normal. No respiratory distress.      Breath sounds: Normal breath sounds.   Abdominal:      General: Abdomen is flat. There is no distension.      Palpations: Abdomen is soft.      Tenderness: There is no abdominal tenderness.   Musculoskeletal:         General: No swelling. Normal range of motion.      Cervical back: Normal range of motion and neck supple.   Skin:     General: Skin is warm and dry.      Capillary Refill: Capillary refill takes less than 2 seconds.   Neurological:      General: No focal deficit present.      Mental Status: She is alert and oriented to person, place, and time. Mental status is at baseline.   Psychiatric:         Mood and Affect: Mood normal.         Behavior: Behavior normal.         Thought Content: Thought content normal.         Judgment: Judgment normal.         See PE  Lab: H&H at d/c: 10.8/31.2  /64   Wt 97.1 kg (214 lb)   LMP 09/10/2020   BMI 33.52 kg/m²     Assessment:    1. PP care   2. Exam WNL   3. Pap deferred d/t age  4. Desires contraception       Plan:    1. Well woman exam yearly  2. Continue PNV   3. Contraceptive counseling - Discussed with patient today were forms of birth control. We reviewed birth control pills and their use, risks benefits and side effects. I reviewed Depo-Provera use as well as risk-benefit side effect, I also discussed with the patient NuvaRing risks benefits and side effects. We also discussed IUDs, discussed progesterone containing IUDs such as Mirena and Radha. I also discussed ParaGard IUD. I discussed risks benefits and side effects of all these medications. We also discussed  Nexplanon, subdermal implant, risks benefits and side effects.  Also discussed with patient were barrier methods especially condoms for prevention of STDs.  Depo provera injection given today.    4. Encouraged condom use x 7 days and for STI prevention  5. Discussed diet, exercise and resumption of sexual activity   6. Preconception guidance for next pregnancy if applicable. Possible cervical incompetence as risk factors.Encouraged to establish early prenatal care with next pregnancy.  Folic acid for all women of childbearing age.   7. Referral to PCP placed.

## 2023-02-16 NOTE — TELEPHONE ENCOUNTER
----- Message from Angel Lew M.D. sent at 5/6/2021  8:44 AM PDT -----  Normal growth      5/6/2021 1252 Left message for pt to call back regarding US results.   5/7/2021 0947  Left message for pt to call back regarding US results.  1007 pt called back and notified as above.      
Statement Selected

## 2023-07-23 ENCOUNTER — HOSPITAL ENCOUNTER (EMERGENCY)
Facility: MEDICAL CENTER | Age: 23
End: 2023-07-24
Attending: OBSTETRICS & GYNECOLOGY | Admitting: OBSTETRICS & GYNECOLOGY
Payer: MEDICAID

## 2023-07-23 VITALS
DIASTOLIC BLOOD PRESSURE: 58 MMHG | HEIGHT: 66 IN | HEART RATE: 95 BPM | OXYGEN SATURATION: 97 % | WEIGHT: 205 LBS | RESPIRATION RATE: 16 BRPM | BODY MASS INDEX: 32.95 KG/M2 | TEMPERATURE: 98.3 F | SYSTOLIC BLOOD PRESSURE: 122 MMHG

## 2023-07-23 LAB
APPEARANCE UR: CLEAR
COLOR UR AUTO: YELLOW
GLUCOSE UR QL STRIP.AUTO: NEGATIVE MG/DL
KETONES UR QL STRIP.AUTO: NEGATIVE MG/DL
LEUKOCYTE ESTERASE UR QL STRIP.AUTO: NEGATIVE
NITRITE UR QL STRIP.AUTO: NEGATIVE
PH UR STRIP.AUTO: 5.5 [PH] (ref 5–8)
PROT UR QL STRIP: NEGATIVE MG/DL
RBC UR QL AUTO: NEGATIVE
SP GR UR STRIP.AUTO: 1.01 (ref 1–1.03)

## 2023-07-23 PROCEDURE — 302449 STATCHG TRIAGE ONLY (STATISTIC)

## 2023-07-23 PROCEDURE — A9270 NON-COVERED ITEM OR SERVICE: HCPCS | Mod: UD | Performed by: OBSTETRICS & GYNECOLOGY

## 2023-07-23 PROCEDURE — 700102 HCHG RX REV CODE 250 W/ 637 OVERRIDE(OP): Mod: UD | Performed by: OBSTETRICS & GYNECOLOGY

## 2023-07-23 PROCEDURE — 81002 URINALYSIS NONAUTO W/O SCOPE: CPT

## 2023-07-23 PROCEDURE — 700111 HCHG RX REV CODE 636 W/ 250 OVERRIDE (IP): Mod: UD | Performed by: OBSTETRICS & GYNECOLOGY

## 2023-07-23 RX ORDER — ACETAMINOPHEN 500 MG
1000 TABLET ORAL ONCE
Status: COMPLETED | OUTPATIENT
Start: 2023-07-23 | End: 2023-07-23

## 2023-07-23 RX ORDER — ONDANSETRON 4 MG/1
4 TABLET, ORALLY DISINTEGRATING ORAL ONCE
Status: COMPLETED | OUTPATIENT
Start: 2023-07-23 | End: 2023-07-23

## 2023-07-23 RX ADMIN — ACETAMINOPHEN 1000 MG: 500 TABLET, FILM COATED ORAL at 23:02

## 2023-07-23 RX ADMIN — ONDANSETRON 4 MG: 4 TABLET, ORALLY DISINTEGRATING ORAL at 23:02

## 2023-07-23 ASSESSMENT — PAIN SCALES - GENERAL: PAINLEVEL: 6

## 2023-07-24 NOTE — PROGRESS NOTES
22 y.o.  EDC  (18.5 wks)     Pt presents to L&D c/o of n/v that started around 1500 today and intermittent lower pelvic pain. Pt rated pain 6/10. Pt denies taking any medications. Reports +FM. Denies VB/LOF/contractions.    : Audible doppler in the 140s.    : Dr. Darian Archuleta notified of pt's arrival and current status. Orders received for 1g Tylenol and 4mg Zofran. Ok to discharge home if patient start feeling better.    : Pt states feeling much better. Denies any pain and denies any n/v. Pt states she has a f/u with Dr. Darian Archuleta next Wednesday.    0013: Patient discharged home with specific instruction to return to L&D/Physician ie.. Bleeding/ROM/decreased FM/labor/concerns for self or baby.  Patient denies questions or concerns regarding POC since arrival and POC to discharge home.  Patient ambulated out of hospital.

## 2023-12-02 ENCOUNTER — APPOINTMENT (OUTPATIENT)
Dept: RADIOLOGY | Facility: MEDICAL CENTER | Age: 23
End: 2023-12-02
Attending: SPECIALIST
Payer: MEDICAID

## 2023-12-02 ENCOUNTER — HOSPITAL ENCOUNTER (EMERGENCY)
Facility: MEDICAL CENTER | Age: 23
End: 2023-12-03
Attending: OBSTETRICS & GYNECOLOGY | Admitting: OBSTETRICS & GYNECOLOGY
Payer: MEDICAID

## 2023-12-02 VITALS
HEIGHT: 66 IN | SYSTOLIC BLOOD PRESSURE: 133 MMHG | HEART RATE: 75 BPM | BODY MASS INDEX: 35.36 KG/M2 | OXYGEN SATURATION: 98 % | TEMPERATURE: 97.5 F | WEIGHT: 220 LBS | DIASTOLIC BLOOD PRESSURE: 71 MMHG

## 2023-12-02 LAB
ABO GROUP BLD: NORMAL
APPEARANCE UR: CLEAR
BLD GP AB SCN SERPL QL: NORMAL
COLOR UR AUTO: YELLOW
EST. AVERAGE GLUCOSE BLD GHB EST-MCNC: 108 MG/DL
GLUCOSE UR QL STRIP.AUTO: NEGATIVE MG/DL
HBA1C MFR BLD: 5.4 % (ref 4–5.6)
HIV 1+2 AB+HIV1 P24 AG SERPL QL IA: NORMAL
KETONES UR QL STRIP.AUTO: ABNORMAL MG/DL
LEUKOCYTE ESTERASE UR QL STRIP.AUTO: NEGATIVE
NITRITE UR QL STRIP.AUTO: NEGATIVE
PH UR STRIP.AUTO: 6.5 [PH] (ref 5–8)
PROT UR QL STRIP: NEGATIVE MG/DL
RBC UR QL AUTO: NEGATIVE
RH BLD: NORMAL
SP GR UR STRIP.AUTO: 1.02 (ref 1–1.03)

## 2023-12-02 PROCEDURE — 302449 STATCHG TRIAGE ONLY (STATISTIC)

## 2023-12-02 PROCEDURE — 81002 URINALYSIS NONAUTO W/O SCOPE: CPT

## 2023-12-02 PROCEDURE — 86901 BLOOD TYPING SEROLOGIC RH(D): CPT

## 2023-12-02 PROCEDURE — 86900 BLOOD TYPING SEROLOGIC ABO: CPT

## 2023-12-02 PROCEDURE — 86850 RBC ANTIBODY SCREEN: CPT

## 2023-12-02 PROCEDURE — 59025 FETAL NON-STRESS TEST: CPT | Mod: XU

## 2023-12-02 PROCEDURE — 76819 FETAL BIOPHYS PROFIL W/O NST: CPT

## 2023-12-02 PROCEDURE — 87389 HIV-1 AG W/HIV-1&-2 AB AG IA: CPT

## 2023-12-02 PROCEDURE — 87340 HEPATITIS B SURFACE AG IA: CPT

## 2023-12-02 PROCEDURE — 85025 COMPLETE CBC W/AUTO DIFF WBC: CPT

## 2023-12-02 PROCEDURE — 86762 RUBELLA ANTIBODY: CPT

## 2023-12-02 PROCEDURE — 86780 TREPONEMA PALLIDUM: CPT

## 2023-12-02 PROCEDURE — 83036 HEMOGLOBIN GLYCOSYLATED A1C: CPT

## 2023-12-02 PROCEDURE — 86592 SYPHILIS TEST NON-TREP QUAL: CPT

## 2023-12-02 PROCEDURE — 36415 COLL VENOUS BLD VENIPUNCTURE: CPT

## 2023-12-02 PROCEDURE — 86803 HEPATITIS C AB TEST: CPT

## 2023-12-03 ENCOUNTER — HOSPITAL ENCOUNTER (INPATIENT)
Facility: MEDICAL CENTER | Age: 23
LOS: 2 days | End: 2023-12-05
Attending: OBSTETRICS & GYNECOLOGY | Admitting: SPECIALIST
Payer: MEDICAID

## 2023-12-03 ENCOUNTER — ANESTHESIA EVENT (OUTPATIENT)
Dept: OBGYN | Facility: MEDICAL CENTER | Age: 23
End: 2023-12-03
Payer: MEDICAID

## 2023-12-03 ENCOUNTER — ANESTHESIA (OUTPATIENT)
Dept: OBGYN | Facility: MEDICAL CENTER | Age: 23
End: 2023-12-03
Payer: MEDICAID

## 2023-12-03 DIAGNOSIS — G89.18 POST-OPERATIVE PAIN: ICD-10-CM

## 2023-12-03 LAB
BASOPHILS # BLD AUTO: 0.3 % (ref 0–1.8)
BASOPHILS # BLD AUTO: 0.3 % (ref 0–1.8)
BASOPHILS # BLD: 0.02 K/UL (ref 0–0.12)
BASOPHILS # BLD: 0.02 K/UL (ref 0–0.12)
CRYSTALS AMN MICRO: NORMAL
EOSINOPHIL # BLD AUTO: 0.01 K/UL (ref 0–0.51)
EOSINOPHIL # BLD AUTO: 0.03 K/UL (ref 0–0.51)
EOSINOPHIL NFR BLD: 0.2 % (ref 0–6.9)
EOSINOPHIL NFR BLD: 0.4 % (ref 0–6.9)
ERYTHROCYTE [DISTWIDTH] IN BLOOD BY AUTOMATED COUNT: 42.5 FL (ref 35.9–50)
ERYTHROCYTE [DISTWIDTH] IN BLOOD BY AUTOMATED COUNT: 43.2 FL (ref 35.9–50)
HBV SURFACE AG SER QL: NORMAL
HCT VFR BLD AUTO: 38.2 % (ref 37–47)
HCT VFR BLD AUTO: 39.3 % (ref 37–47)
HCV AB SER QL: NORMAL
HGB BLD-MCNC: 13 G/DL (ref 12–16)
HGB BLD-MCNC: 13.2 G/DL (ref 12–16)
HOLDING TUBE BB 8507: NORMAL
IMM GRANULOCYTES # BLD AUTO: 0.02 K/UL (ref 0–0.11)
IMM GRANULOCYTES # BLD AUTO: 0.02 K/UL (ref 0–0.11)
IMM GRANULOCYTES NFR BLD AUTO: 0.3 % (ref 0–0.9)
IMM GRANULOCYTES NFR BLD AUTO: 0.3 % (ref 0–0.9)
LYMPHOCYTES # BLD AUTO: 1.35 K/UL (ref 1–4.8)
LYMPHOCYTES # BLD AUTO: 2.09 K/UL (ref 1–4.8)
LYMPHOCYTES NFR BLD: 23 % (ref 22–41)
LYMPHOCYTES NFR BLD: 28.2 % (ref 22–41)
MCH RBC QN AUTO: 29.1 PG (ref 27–33)
MCH RBC QN AUTO: 29.4 PG (ref 27–33)
MCHC RBC AUTO-ENTMCNC: 33.6 G/DL (ref 32.2–35.5)
MCHC RBC AUTO-ENTMCNC: 34 G/DL (ref 32.2–35.5)
MCV RBC AUTO: 86.4 FL (ref 81.4–97.8)
MCV RBC AUTO: 86.8 FL (ref 81.4–97.8)
MONOCYTES # BLD AUTO: 0.42 K/UL (ref 0–0.85)
MONOCYTES # BLD AUTO: 0.55 K/UL (ref 0–0.85)
MONOCYTES NFR BLD AUTO: 7.2 % (ref 0–13.4)
MONOCYTES NFR BLD AUTO: 7.4 % (ref 0–13.4)
NEUTROPHILS # BLD AUTO: 4.05 K/UL (ref 1.82–7.42)
NEUTROPHILS # BLD AUTO: 4.71 K/UL (ref 1.82–7.42)
NEUTROPHILS NFR BLD: 63.4 % (ref 44–72)
NEUTROPHILS NFR BLD: 69 % (ref 44–72)
NRBC # BLD AUTO: 0 K/UL
NRBC # BLD AUTO: 0 K/UL
NRBC BLD-RTO: 0 /100 WBC (ref 0–0.2)
NRBC BLD-RTO: 0 /100 WBC (ref 0–0.2)
PLATELET # BLD AUTO: 250 K/UL (ref 164–446)
PLATELET # BLD AUTO: 257 K/UL (ref 164–446)
PMV BLD AUTO: 11.1 FL (ref 9–12.9)
PMV BLD AUTO: 11.7 FL (ref 9–12.9)
RBC # BLD AUTO: 4.42 M/UL (ref 4.2–5.4)
RBC # BLD AUTO: 4.53 M/UL (ref 4.2–5.4)
RUBV AB SER QL: 38.1 IU/ML
T PALLIDUM AB SER QL IA: NORMAL
WBC # BLD AUTO: 5.9 K/UL (ref 4.8–10.8)
WBC # BLD AUTO: 7.4 K/UL (ref 4.8–10.8)

## 2023-12-03 PROCEDURE — 160009 HCHG ANES TIME/MIN: Performed by: SPECIALIST

## 2023-12-03 PROCEDURE — 770002 HCHG ROOM/CARE - OB PRIVATE (112)

## 2023-12-03 PROCEDURE — C1755 CATHETER, INTRASPINAL: HCPCS | Performed by: SPECIALIST

## 2023-12-03 PROCEDURE — 700111 HCHG RX REV CODE 636 W/ 250 OVERRIDE (IP): Performed by: STUDENT IN AN ORGANIZED HEALTH CARE EDUCATION/TRAINING PROGRAM

## 2023-12-03 PROCEDURE — A9270 NON-COVERED ITEM OR SERVICE: HCPCS | Performed by: STUDENT IN AN ORGANIZED HEALTH CARE EDUCATION/TRAINING PROGRAM

## 2023-12-03 PROCEDURE — 85025 COMPLETE CBC W/AUTO DIFF WBC: CPT

## 2023-12-03 PROCEDURE — 700105 HCHG RX REV CODE 258: Performed by: SPECIALIST

## 2023-12-03 PROCEDURE — 700105 HCHG RX REV CODE 258: Performed by: STUDENT IN AN ORGANIZED HEALTH CARE EDUCATION/TRAINING PROGRAM

## 2023-12-03 PROCEDURE — 160002 HCHG RECOVERY MINUTES (STAT): Performed by: SPECIALIST

## 2023-12-03 PROCEDURE — 700111 HCHG RX REV CODE 636 W/ 250 OVERRIDE (IP): Performed by: SPECIALIST

## 2023-12-03 PROCEDURE — 160041 HCHG SURGERY MINUTES - EA ADDL 1 MIN LEVEL 4: Performed by: SPECIALIST

## 2023-12-03 PROCEDURE — 700111 HCHG RX REV CODE 636 W/ 250 OVERRIDE (IP): Mod: JZ | Performed by: SPECIALIST

## 2023-12-03 PROCEDURE — 89060 EXAM SYNOVIAL FLUID CRYSTALS: CPT

## 2023-12-03 PROCEDURE — 700102 HCHG RX REV CODE 250 W/ 637 OVERRIDE(OP): Performed by: STUDENT IN AN ORGANIZED HEALTH CARE EDUCATION/TRAINING PROGRAM

## 2023-12-03 PROCEDURE — 700101 HCHG RX REV CODE 250: Performed by: SPECIALIST

## 2023-12-03 PROCEDURE — 160035 HCHG PACU - 1ST 60 MINS PHASE I: Performed by: SPECIALIST

## 2023-12-03 PROCEDURE — 59514 CESAREAN DELIVERY ONLY: CPT | Mod: 80 | Performed by: OBSTETRICS & GYNECOLOGY

## 2023-12-03 PROCEDURE — 160029 HCHG SURGERY MINUTES - 1ST 30 MINS LEVEL 4: Performed by: SPECIALIST

## 2023-12-03 PROCEDURE — 36415 COLL VENOUS BLD VENIPUNCTURE: CPT

## 2023-12-03 PROCEDURE — 160048 HCHG OR STATISTICAL LEVEL 1-5: Performed by: SPECIALIST

## 2023-12-03 PROCEDURE — 700111 HCHG RX REV CODE 636 W/ 250 OVERRIDE (IP): Mod: JZ | Performed by: STUDENT IN AN ORGANIZED HEALTH CARE EDUCATION/TRAINING PROGRAM

## 2023-12-03 PROCEDURE — 700101 HCHG RX REV CODE 250: Performed by: STUDENT IN AN ORGANIZED HEALTH CARE EDUCATION/TRAINING PROGRAM

## 2023-12-03 RX ORDER — ONDANSETRON 4 MG/1
4 TABLET, ORALLY DISINTEGRATING ORAL EVERY 6 HOURS PRN
Status: DISCONTINUED | OUTPATIENT
Start: 2023-12-04 | End: 2023-12-05 | Stop reason: HOSPADM

## 2023-12-03 RX ORDER — OXYTOCIN 10 [USP'U]/ML
10 INJECTION, SOLUTION INTRAMUSCULAR; INTRAVENOUS
Status: DISCONTINUED | OUTPATIENT
Start: 2023-12-03 | End: 2023-12-05 | Stop reason: HOSPADM

## 2023-12-03 RX ORDER — SODIUM CHLORIDE, SODIUM GLUCONATE, SODIUM ACETATE, POTASSIUM CHLORIDE AND MAGNESIUM CHLORIDE 526; 502; 368; 37; 30 MG/100ML; MG/100ML; MG/100ML; MG/100ML; MG/100ML
INJECTION, SOLUTION INTRAVENOUS
Status: DISCONTINUED | OUTPATIENT
Start: 2023-12-03 | End: 2023-12-03 | Stop reason: SURG

## 2023-12-03 RX ORDER — ACETAMINOPHEN 500 MG
1000 TABLET ORAL EVERY 6 HOURS
Status: DISCONTINUED | OUTPATIENT
Start: 2023-12-04 | End: 2023-12-05 | Stop reason: HOSPADM

## 2023-12-03 RX ORDER — HYDRALAZINE HYDROCHLORIDE 20 MG/ML
5 INJECTION INTRAMUSCULAR; INTRAVENOUS
Status: DISCONTINUED | OUTPATIENT
Start: 2023-12-03 | End: 2023-12-03 | Stop reason: HOSPADM

## 2023-12-03 RX ORDER — ALBUTEROL SULFATE 2.5 MG/3ML
2.5 SOLUTION RESPIRATORY (INHALATION)
Status: DISCONTINUED | OUTPATIENT
Start: 2023-12-03 | End: 2023-12-03 | Stop reason: HOSPADM

## 2023-12-03 RX ORDER — KETOROLAC TROMETHAMINE 30 MG/ML
30 INJECTION, SOLUTION INTRAMUSCULAR; INTRAVENOUS EVERY 6 HOURS
Status: COMPLETED | OUTPATIENT
Start: 2023-12-03 | End: 2023-12-04

## 2023-12-03 RX ORDER — DIPHENHYDRAMINE HYDROCHLORIDE 50 MG/ML
12.5 INJECTION INTRAMUSCULAR; INTRAVENOUS EVERY 6 HOURS PRN
Status: ACTIVE | OUTPATIENT
Start: 2023-12-03 | End: 2023-12-04

## 2023-12-03 RX ORDER — ONDANSETRON 2 MG/ML
4 INJECTION INTRAMUSCULAR; INTRAVENOUS
Status: DISCONTINUED | OUTPATIENT
Start: 2023-12-03 | End: 2023-12-03 | Stop reason: HOSPADM

## 2023-12-03 RX ORDER — CALCIUM CARBONATE 500 MG/1
1000 TABLET, CHEWABLE ORAL EVERY 6 HOURS PRN
Status: DISCONTINUED | OUTPATIENT
Start: 2023-12-03 | End: 2023-12-05 | Stop reason: HOSPADM

## 2023-12-03 RX ORDER — VITAMIN A ACETATE, BETA CAROTENE, ASCORBIC ACID, CHOLECALCIFEROL, .ALPHA.-TOCOPHEROL ACETATE, DL-, THIAMINE MONONITRATE, RIBOFLAVIN, NIACINAMIDE, PYRIDOXINE HYDROCHLORIDE, FOLIC ACID, CYANOCOBALAMIN, CALCIUM CARBONATE, FERROUS FUMARATE, ZINC OXIDE, CUPRIC OXIDE 3080; 12; 120; 400; 1; 1.84; 3; 20; 22; 920; 25; 200; 27; 10; 2 [IU]/1; UG/1; MG/1; [IU]/1; MG/1; MG/1; MG/1; MG/1; MG/1; [IU]/1; MG/1; MG/1; MG/1; MG/1; MG/1
1 TABLET, FILM COATED ORAL
Status: DISCONTINUED | OUTPATIENT
Start: 2023-12-04 | End: 2023-12-05 | Stop reason: HOSPADM

## 2023-12-03 RX ORDER — OXYCODONE HYDROCHLORIDE 10 MG/1
10 TABLET ORAL EVERY 4 HOURS PRN
Status: DISCONTINUED | OUTPATIENT
Start: 2023-12-04 | End: 2023-12-05 | Stop reason: HOSPADM

## 2023-12-03 RX ORDER — OXYCODONE HYDROCHLORIDE 5 MG/1
5 TABLET ORAL EVERY 4 HOURS PRN
Status: DISCONTINUED | OUTPATIENT
Start: 2023-12-04 | End: 2023-12-05 | Stop reason: HOSPADM

## 2023-12-03 RX ORDER — OXYCODONE HYDROCHLORIDE 5 MG/1
5 TABLET ORAL EVERY 4 HOURS PRN
Status: ACTIVE | OUTPATIENT
Start: 2023-12-03 | End: 2023-12-04

## 2023-12-03 RX ORDER — ACETAMINOPHEN 500 MG
1000 TABLET ORAL EVERY 6 HOURS
Status: COMPLETED | OUTPATIENT
Start: 2023-12-03 | End: 2023-12-04

## 2023-12-03 RX ORDER — EPHEDRINE SULFATE 50 MG/ML
5 INJECTION, SOLUTION INTRAVENOUS
Status: DISCONTINUED | OUTPATIENT
Start: 2023-12-03 | End: 2023-12-03 | Stop reason: HOSPADM

## 2023-12-03 RX ORDER — DEXAMETHASONE SODIUM PHOSPHATE 4 MG/ML
INJECTION, SOLUTION INTRA-ARTICULAR; INTRALESIONAL; INTRAMUSCULAR; INTRAVENOUS; SOFT TISSUE PRN
Status: DISCONTINUED | OUTPATIENT
Start: 2023-12-03 | End: 2023-12-03 | Stop reason: SURG

## 2023-12-03 RX ORDER — ONDANSETRON 2 MG/ML
4 INJECTION INTRAMUSCULAR; INTRAVENOUS EVERY 6 HOURS PRN
Status: DISCONTINUED | OUTPATIENT
Start: 2023-12-04 | End: 2023-12-05 | Stop reason: HOSPADM

## 2023-12-03 RX ORDER — SIMETHICONE 125 MG
125 TABLET,CHEWABLE ORAL 4 TIMES DAILY PRN
Status: DISCONTINUED | OUTPATIENT
Start: 2023-12-03 | End: 2023-12-05 | Stop reason: HOSPADM

## 2023-12-03 RX ORDER — EPHEDRINE SULFATE 50 MG/ML
10 INJECTION, SOLUTION INTRAVENOUS
Status: ACTIVE | OUTPATIENT
Start: 2023-12-03 | End: 2023-12-04

## 2023-12-03 RX ORDER — HALOPERIDOL 5 MG/ML
1 INJECTION INTRAMUSCULAR
Status: DISCONTINUED | OUTPATIENT
Start: 2023-12-03 | End: 2023-12-03 | Stop reason: HOSPADM

## 2023-12-03 RX ORDER — DIPHENHYDRAMINE HCL 25 MG
25 TABLET ORAL EVERY 6 HOURS PRN
Status: DISCONTINUED | OUTPATIENT
Start: 2023-12-04 | End: 2023-12-05 | Stop reason: HOSPADM

## 2023-12-03 RX ORDER — ACETAMINOPHEN 500 MG
1000 TABLET ORAL EVERY 6 HOURS PRN
Status: DISCONTINUED | OUTPATIENT
Start: 2023-12-07 | End: 2023-12-05 | Stop reason: HOSPADM

## 2023-12-03 RX ORDER — HYDROMORPHONE HYDROCHLORIDE 1 MG/ML
0.1 INJECTION, SOLUTION INTRAMUSCULAR; INTRAVENOUS; SUBCUTANEOUS
Status: DISCONTINUED | OUTPATIENT
Start: 2023-12-03 | End: 2023-12-03 | Stop reason: HOSPADM

## 2023-12-03 RX ORDER — SODIUM CHLORIDE, SODIUM GLUCONATE, SODIUM ACETATE, POTASSIUM CHLORIDE AND MAGNESIUM CHLORIDE 526; 502; 368; 37; 30 MG/100ML; MG/100ML; MG/100ML; MG/100ML; MG/100ML
1500 INJECTION, SOLUTION INTRAVENOUS ONCE
Status: COMPLETED | OUTPATIENT
Start: 2023-12-03 | End: 2023-12-03

## 2023-12-03 RX ORDER — BUPIVACAINE HYDROCHLORIDE 7.5 MG/ML
INJECTION, SOLUTION INTRASPINAL PRN
Status: DISCONTINUED | OUTPATIENT
Start: 2023-12-03 | End: 2023-12-03 | Stop reason: SURG

## 2023-12-03 RX ORDER — OXYCODONE HCL 5 MG/5 ML
5 SOLUTION, ORAL ORAL
Status: DISCONTINUED | OUTPATIENT
Start: 2023-12-03 | End: 2023-12-03 | Stop reason: HOSPADM

## 2023-12-03 RX ORDER — SODIUM CHLORIDE, SODIUM LACTATE, POTASSIUM CHLORIDE, CALCIUM CHLORIDE 600; 310; 30; 20 MG/100ML; MG/100ML; MG/100ML; MG/100ML
INJECTION, SOLUTION INTRAVENOUS PRN
Status: DISCONTINUED | OUTPATIENT
Start: 2023-12-03 | End: 2023-12-05 | Stop reason: HOSPADM

## 2023-12-03 RX ORDER — DIPHENHYDRAMINE HYDROCHLORIDE 50 MG/ML
25 INJECTION INTRAMUSCULAR; INTRAVENOUS EVERY 6 HOURS PRN
Status: ACTIVE | OUTPATIENT
Start: 2023-12-03 | End: 2023-12-04

## 2023-12-03 RX ORDER — PHENYLEPHRINE HCL IN 0.9% NACL 0.5 MG/5ML
SYRINGE (ML) INTRAVENOUS PRN
Status: DISCONTINUED | OUTPATIENT
Start: 2023-12-03 | End: 2023-12-03 | Stop reason: SURG

## 2023-12-03 RX ORDER — METOPROLOL TARTRATE 1 MG/ML
1 INJECTION, SOLUTION INTRAVENOUS
Status: DISCONTINUED | OUTPATIENT
Start: 2023-12-03 | End: 2023-12-03 | Stop reason: HOSPADM

## 2023-12-03 RX ORDER — MORPHINE SULFATE 0.5 MG/ML
INJECTION, SOLUTION EPIDURAL; INTRATHECAL; INTRAVENOUS PRN
Status: DISCONTINUED | OUTPATIENT
Start: 2023-12-03 | End: 2023-12-03 | Stop reason: SURG

## 2023-12-03 RX ORDER — AZITHROMYCIN 500 MG/5ML
500 INJECTION, POWDER, LYOPHILIZED, FOR SOLUTION INTRAVENOUS ONCE
Status: COMPLETED | OUTPATIENT
Start: 2023-12-03 | End: 2023-12-03

## 2023-12-03 RX ORDER — ONDANSETRON 2 MG/ML
4 INJECTION INTRAMUSCULAR; INTRAVENOUS EVERY 6 HOURS PRN
Status: ACTIVE | OUTPATIENT
Start: 2023-12-03 | End: 2023-12-04

## 2023-12-03 RX ORDER — LABETALOL HYDROCHLORIDE 5 MG/ML
5 INJECTION, SOLUTION INTRAVENOUS
Status: DISCONTINUED | OUTPATIENT
Start: 2023-12-03 | End: 2023-12-03 | Stop reason: HOSPADM

## 2023-12-03 RX ORDER — HYDROMORPHONE HYDROCHLORIDE 1 MG/ML
0.2 INJECTION, SOLUTION INTRAMUSCULAR; INTRAVENOUS; SUBCUTANEOUS
Status: ACTIVE | OUTPATIENT
Start: 2023-12-03 | End: 2023-12-04

## 2023-12-03 RX ORDER — CITRIC ACID/SODIUM CITRATE 334-500MG
30 SOLUTION, ORAL ORAL ONCE
Status: COMPLETED | OUTPATIENT
Start: 2023-12-03 | End: 2023-12-03

## 2023-12-03 RX ORDER — OXYCODONE HCL 5 MG/5 ML
10 SOLUTION, ORAL ORAL
Status: DISCONTINUED | OUTPATIENT
Start: 2023-12-03 | End: 2023-12-03 | Stop reason: HOSPADM

## 2023-12-03 RX ORDER — HYDROMORPHONE HYDROCHLORIDE 1 MG/ML
0.4 INJECTION, SOLUTION INTRAMUSCULAR; INTRAVENOUS; SUBCUTANEOUS
Status: DISCONTINUED | OUTPATIENT
Start: 2023-12-03 | End: 2023-12-03 | Stop reason: HOSPADM

## 2023-12-03 RX ORDER — KETOROLAC TROMETHAMINE 30 MG/ML
INJECTION, SOLUTION INTRAMUSCULAR; INTRAVENOUS PRN
Status: DISCONTINUED | OUTPATIENT
Start: 2023-12-03 | End: 2023-12-03 | Stop reason: SURG

## 2023-12-03 RX ORDER — DOCUSATE SODIUM 100 MG/1
100 CAPSULE, LIQUID FILLED ORAL 2 TIMES DAILY PRN
Status: DISCONTINUED | OUTPATIENT
Start: 2023-12-03 | End: 2023-12-05 | Stop reason: HOSPADM

## 2023-12-03 RX ORDER — DIPHENHYDRAMINE HYDROCHLORIDE 50 MG/ML
12.5 INJECTION INTRAMUSCULAR; INTRAVENOUS
Status: DISCONTINUED | OUTPATIENT
Start: 2023-12-03 | End: 2023-12-03 | Stop reason: HOSPADM

## 2023-12-03 RX ORDER — METOCLOPRAMIDE HYDROCHLORIDE 5 MG/ML
10 INJECTION INTRAMUSCULAR; INTRAVENOUS ONCE
Status: COMPLETED | OUTPATIENT
Start: 2023-12-03 | End: 2023-12-03

## 2023-12-03 RX ORDER — OXYCODONE HYDROCHLORIDE 10 MG/1
10 TABLET ORAL EVERY 4 HOURS PRN
Status: ACTIVE | OUTPATIENT
Start: 2023-12-03 | End: 2023-12-04

## 2023-12-03 RX ORDER — OXYTOCIN 10 [USP'U]/ML
INJECTION, SOLUTION INTRAMUSCULAR; INTRAVENOUS PRN
Status: DISCONTINUED | OUTPATIENT
Start: 2023-12-03 | End: 2023-12-03 | Stop reason: SURG

## 2023-12-03 RX ORDER — SODIUM CHLORIDE, SODIUM LACTATE, POTASSIUM CHLORIDE, CALCIUM CHLORIDE 600; 310; 30; 20 MG/100ML; MG/100ML; MG/100ML; MG/100ML
INJECTION, SOLUTION INTRAVENOUS CONTINUOUS
Status: DISCONTINUED | OUTPATIENT
Start: 2023-12-03 | End: 2023-12-03 | Stop reason: HOSPADM

## 2023-12-03 RX ORDER — SODIUM CHLORIDE, SODIUM LACTATE, POTASSIUM CHLORIDE, CALCIUM CHLORIDE 600; 310; 30; 20 MG/100ML; MG/100ML; MG/100ML; MG/100ML
INJECTION, SOLUTION INTRAVENOUS ONCE
Status: DISCONTINUED | OUTPATIENT
Start: 2023-12-03 | End: 2023-12-03 | Stop reason: HOSPADM

## 2023-12-03 RX ORDER — HYDROMORPHONE HYDROCHLORIDE 1 MG/ML
0.4 INJECTION, SOLUTION INTRAMUSCULAR; INTRAVENOUS; SUBCUTANEOUS
Status: ACTIVE | OUTPATIENT
Start: 2023-12-03 | End: 2023-12-04

## 2023-12-03 RX ORDER — CEFAZOLIN SODIUM 1 G/3ML
2 INJECTION, POWDER, FOR SOLUTION INTRAMUSCULAR; INTRAVENOUS ONCE
Status: COMPLETED | OUTPATIENT
Start: 2023-12-03 | End: 2023-12-03

## 2023-12-03 RX ORDER — DIPHENHYDRAMINE HYDROCHLORIDE 50 MG/ML
25 INJECTION INTRAMUSCULAR; INTRAVENOUS EVERY 6 HOURS PRN
Status: DISCONTINUED | OUTPATIENT
Start: 2023-12-04 | End: 2023-12-05 | Stop reason: HOSPADM

## 2023-12-03 RX ORDER — ONDANSETRON 2 MG/ML
INJECTION INTRAMUSCULAR; INTRAVENOUS PRN
Status: DISCONTINUED | OUTPATIENT
Start: 2023-12-03 | End: 2023-12-03 | Stop reason: SURG

## 2023-12-03 RX ORDER — MEPERIDINE HYDROCHLORIDE 25 MG/ML
6.25 INJECTION INTRAMUSCULAR; INTRAVENOUS; SUBCUTANEOUS
Status: DISCONTINUED | OUTPATIENT
Start: 2023-12-03 | End: 2023-12-03 | Stop reason: HOSPADM

## 2023-12-03 RX ORDER — HYDROMORPHONE HYDROCHLORIDE 1 MG/ML
0.2 INJECTION, SOLUTION INTRAMUSCULAR; INTRAVENOUS; SUBCUTANEOUS
Status: DISCONTINUED | OUTPATIENT
Start: 2023-12-03 | End: 2023-12-03 | Stop reason: HOSPADM

## 2023-12-03 RX ADMIN — Medication 100 MCG: at 09:48

## 2023-12-03 RX ADMIN — Medication 200 MCG: at 10:19

## 2023-12-03 RX ADMIN — MORPHINE SULFATE 100 MCG: 0.5 INJECTION, SOLUTION EPIDURAL; INTRATHECAL; INTRAVENOUS at 09:43

## 2023-12-03 RX ADMIN — SODIUM CHLORIDE, SODIUM GLUCONATE, SODIUM ACETATE, POTASSIUM CHLORIDE AND MAGNESIUM CHLORIDE: 526; 502; 368; 37; 30 INJECTION, SOLUTION INTRAVENOUS at 09:35

## 2023-12-03 RX ADMIN — Medication 100 MCG: at 09:46

## 2023-12-03 RX ADMIN — Medication 200 MCG: at 10:07

## 2023-12-03 RX ADMIN — Medication 100 MCG: at 10:04

## 2023-12-03 RX ADMIN — SODIUM CITRATE AND CITRIC ACID MONOHYDRATE 30 ML: 500; 334 SOLUTION ORAL at 09:29

## 2023-12-03 RX ADMIN — Medication 100 MCG: at 10:14

## 2023-12-03 RX ADMIN — OXYTOCIN 125 ML/HR: 10 INJECTION, SOLUTION INTRAMUSCULAR; INTRAVENOUS at 12:16

## 2023-12-03 RX ADMIN — ACETAMINOPHEN 1000 MG: 500 TABLET, FILM COATED ORAL at 18:20

## 2023-12-03 RX ADMIN — BUPIVACAINE HYDROCHLORIDE IN DEXTROSE 1.5 MG: 7.5 INJECTION, SOLUTION SUBARACHNOID at 09:43

## 2023-12-03 RX ADMIN — ONDANSETRON 4 MG: 2 INJECTION INTRAMUSCULAR; INTRAVENOUS at 09:55

## 2023-12-03 RX ADMIN — DEXAMETHASONE SODIUM PHOSPHATE 8 MG: 4 INJECTION INTRA-ARTICULAR; INTRALESIONAL; INTRAMUSCULAR; INTRAVENOUS; SOFT TISSUE at 09:45

## 2023-12-03 RX ADMIN — CEFAZOLIN 2 G: 1 INJECTION, POWDER, FOR SOLUTION INTRAMUSCULAR; INTRAVENOUS at 09:48

## 2023-12-03 RX ADMIN — SODIUM CHLORIDE, SODIUM GLUCONATE, SODIUM ACETATE, POTASSIUM CHLORIDE AND MAGNESIUM CHLORIDE 1500 ML: 526; 502; 368; 37; 30 INJECTION, SOLUTION INTRAVENOUS at 09:29

## 2023-12-03 RX ADMIN — FENTANYL CITRATE 15 MCG: 50 INJECTION, SOLUTION INTRAMUSCULAR; INTRAVENOUS at 09:43

## 2023-12-03 RX ADMIN — METOCLOPRAMIDE 10 MG: 5 INJECTION, SOLUTION INTRAMUSCULAR; INTRAVENOUS at 09:29

## 2023-12-03 RX ADMIN — AZITHROMYCIN 500 MG: 500 INJECTION, POWDER, LYOPHILIZED, FOR SOLUTION INTRAVENOUS at 09:52

## 2023-12-03 RX ADMIN — OXYTOCIN 20 UNITS: 10 INJECTION, SOLUTION INTRAMUSCULAR; INTRAVENOUS at 10:01

## 2023-12-03 RX ADMIN — KETOROLAC TROMETHAMINE 30 MG: 30 INJECTION, SOLUTION INTRAMUSCULAR; INTRAVENOUS at 10:42

## 2023-12-03 RX ADMIN — FAMOTIDINE 20 MG: 10 INJECTION, SOLUTION INTRAVENOUS at 09:29

## 2023-12-03 RX ADMIN — KETOROLAC TROMETHAMINE 30 MG: 30 INJECTION, SOLUTION INTRAMUSCULAR; INTRAVENOUS at 18:20

## 2023-12-03 ASSESSMENT — LIFESTYLE VARIABLES
ALCOHOL_USE: NO
HAVE YOU EVER FELT YOU SHOULD CUT DOWN ON YOUR DRINKING: NO
HOW MANY TIMES IN THE PAST YEAR HAVE YOU HAD 5 OR MORE DRINKS IN A DAY: 0
TOTAL SCORE: 0
DOES PATIENT WANT TO STOP DRINKING: NO
AVERAGE NUMBER OF DAYS PER WEEK YOU HAVE A DRINK CONTAINING ALCOHOL: 0
EVER FELT BAD OR GUILTY ABOUT YOUR DRINKING: NO
EVER_SMOKED: NEVER
CONSUMPTION TOTAL: NEGATIVE
EVER HAD A DRINK FIRST THING IN THE MORNING TO STEADY YOUR NERVES TO GET RID OF A HANGOVER: NO
HAVE PEOPLE ANNOYED YOU BY CRITICIZING YOUR DRINKING: NO
TOTAL SCORE: 0
ON A TYPICAL DAY WHEN YOU DRINK ALCOHOL HOW MANY DRINKS DO YOU HAVE: 0
TOTAL SCORE: 0

## 2023-12-03 ASSESSMENT — PATIENT HEALTH QUESTIONNAIRE - PHQ9
SUM OF ALL RESPONSES TO PHQ9 QUESTIONS 1 AND 2: 0
1. LITTLE INTEREST OR PLEASURE IN DOING THINGS: NOT AT ALL
2. FEELING DOWN, DEPRESSED, IRRITABLE, OR HOPELESS: NOT AT ALL
2. FEELING DOWN, DEPRESSED, IRRITABLE, OR HOPELESS: NOT AT ALL
SUM OF ALL RESPONSES TO PHQ9 QUESTIONS 1 AND 2: 0
1. LITTLE INTEREST OR PLEASURE IN DOING THINGS: NOT AT ALL

## 2023-12-03 ASSESSMENT — PAIN DESCRIPTION - PAIN TYPE: TYPE: SURGICAL PAIN

## 2023-12-03 NOTE — H&P
DATE OF ADMISSION:  12/03/2023     IDENTIFICATION:  The patient is a very pleasant 23-year-old (on admission,   para 1 with 1 previous vaginal delivery) who is today 37 weeks and 5 days   gestation.     CHIEF COMPLAINT:  The patient presents to labor and delivery with complaints   of leakage of fluid per vagina starting at about 5 o'clock this morning.     HISTORY OF PRESENT ILLNESS:  The patient has had prenatal care with Dr. Darian Archuleta.  However, she says that after having had prenatal care with Dr. Darian Archuleta, she lived in [place] for about 3 months and then just recently   returned.  She is [place] speaking only.  She actually reported to labor and   delivery yesterday evening and when she reported to labor and delivery   yesterday evening, she presented yesterday evening with complaints of nausea   and vomiting, and intermittent pelvic pain and initially, it was questionable   as to whether or not the fetal heart tracing was reactive, but then, the fetal   heart tracing became reactive.  I ordered a biophysical profile when it was   questionable as to whether or not the fetal heart tracing was reactive and the   biophysical profile was 8/8, although, the amniotic fluid index was only 5   cm.  Then, the fetal heart tracing became reactive.  The patient reported that   she had not had any prenatal care in about 3 months because she had been in   [place] and had last seen Dr. Darian Archuleta approximately 3 months previously.    When she presented to labor and delivery yesterday evening, her cervix was   found to be 3 cm dilated and 50% effaced and -2 station.  The patient was   discharged home yesterday evening and instructed to [facility]   as soon as possible to establish care, especially because the ultrasound,   which was performed for biophysical profile revealed fetal breech   presentation.  Then, the patient presented to Labor and Delivery at about   06:20 this morning saying that she had  begun noticing leakage of fluid per   vagina starting at about 5 o'clock this morning and then after she presented   to Labor and Delivery this morning at about 06:20 this morning, exam revealed   obvious evidence of rupture of membranes and her cervix was found to be 3 cm   dilated, 50% effaced and -2 station.  She had last eaten at about 2 o'clock   this morning.  This is what she said.  So, she was admitted this morning with   plans to proceed with primary  section because of fetal breech   presentation at 39 weeks and 5 days gestation, and spontaneous rupture of   membranes (term premature rupture of membranes).  Then, the patient began   having contractions and her contractions became more frequent and painful, and   repeat exam revealed that baby's lower extremities were in the vaginal vault.    So, the decision was made to proceed with  section promptly.  She   was brought to the operating room and a  section was performed.    Please see separately dictated postoperative note.     PAST MEDICAL HISTORY:  The patient says she has no medical illnesses.     PAST SURGICAL HISTORY:  The patient says that at one point in time, she had   surgery involving her left wrist and it appears that this was probably surgery   to address carpal tunnel syndrome.     MEDICATIONS:  The patient takes no medications other than for prenatal   vitamins.     ALLERGIES:  The patient says SHE IS ALLERGIC TO ASPIRIN, otherwise has no   other drug allergies.     SOCIAL HISTORY:  The patient denies smoking.  She denies consuming alcoholic   beverages.  She denies use of recreational drugs.     REVIEW OF SYSTEMS:    GENERAL:  The patient denies any fevers or chills or sweats.  PULMONARY:  The patient denies any coughing or wheezing or chest pain or   shortness of breath.  CARDIOVASCULAR:  The patient denies any palpitations, dyspnea, or chest pain.  GASTROINTESTINAL:  The patient last night was having some nausea and  vomiting,   but does not report any nausea, vomiting this morning, nor did she report any   diarrhea or constipation.  GENITOURINARY:  The patient presented this morning with complaints of leakage   of fluid per vagina starting at 5 o'clock this morning, then after she arrived   in labor and delivery, she began having frequent and painful uterine   contractions.  MUSCULOSKELETAL:  No arthralgias or myalgias other than for hip pain and low   back pain.  NEUROLOGICAL:  The patient denies any headaches or syncope or seizures.     PHYSICAL EXAMINATION:    VITAL SIGNS:  The patient's vital signs are stable and she is afebrile.  GENERAL:  The patient appears well developed and well nourished and during   contractions, she appears to be uncomfortable.  HEENT:  Normocephalic, atraumatic.  CHEST:  Heart is regular rate and rhythm, no murmur.  LUNGS:  Clear to auscultation bilaterally.  ABDOMEN:  Gravid, about 9-month size.  PELVIC:  Pelvic exam reveals fetus's lower extremities in the vaginal vault.  EXTREMITIES:  No clubbing, cyanosis or edema except for mild bilateral   symmetrical lower extremity edema consistent with term pregnancy.  NEUROLOGIC:  Nonfocal.     ASSESSMENT:    1.  Intrauterine pregnancy at 37 weeks and 5 days gestation.  2.  Spontaneous rupture of membranes.  3.  Labor starting within 24 hours after term premature rupture of membranes   and the patient is in active labor.  4.  Fetal breech presentation (footling breech presentation).  5.  It appears the patient has had no prenatal care in the last 3 months.     PLAN:  The patient was brought to the operating room for emergent    section.  Please see the separately dictated postoperative note for details   regarding the  section.        ______________________________  Clayton Cook MD    MED/DVM    DD:  2023 11:16  DT:  2023 11:38    Job#:  [id number]    CC:Kathe Herzog MD

## 2023-12-03 NOTE — ANESTHESIA TIME REPORT
Anesthesia Start and Stop Event Times       Date Time Event    12/3/2023 0932 Ready for Procedure     0935 Anesthesia Start     1053 Anesthesia Stop          Responsible Staff  12/03/23      Name Role Begin End    Sam Hayden D.O. Anesth 0935 1053          Overtime Reason:  no overtime (within assigned shift)    Comments:

## 2023-12-03 NOTE — PROGRESS NOTES
: Pt arrives to L&D reporting irregular UC's. Pt is a  with an EDC of 23, making her 37.4 per EPIC. Pt reports she is approx 39 weeks. Pt reports she has not had prenatal care in 3 months because she has been in [place]. She last saw Dr. Darian Archuleta approx 3 months ago. Pt reports she never received a letter. She tried to call Darian Archuleta's office for an appt and was told she could not based on insurance.   Pt denies LOF, denies VB. Denies any problems with pregnancy that she is aware of. Pt reports pain with UC's 3-4/10.  SVE 3/50/-2.  : Called Dr Cook to discuss pt and fetal tracing.    and  variable noted.   Orders received for BPP and labs.   : SVE 3/50-2, GBS collected. BPP . Reactive NST  : Dr Cook called and notified. Discharge order received.   : When reviewing discharge orders with the pt, pt told this RN that the [email or web address] tech told her that the fetus was in a breech position.   : This RN called Dr. Cook to discuss. US images reviewed. Fetal tracing sent to Dr. Cook for review.   Order to discharge pt and pt to make appt for follow up care as soon as possible with Dr Darian Archuleta or [facility].   0009: Discussed with pt discharge instructions and labor precautions. Pt verbalizes understanding of returning to the hospital for regular UC's, LOF, VB, decreased fetal movement or pt not being able to get kick counts.   Pt discharging to home via private vehicle with family. Pt has all belongings.   0027: Pt discharged. Pt again verbalizes understanding of discharge instructions. Pt plans to call [facility] at 0800 to schedule appt as she reports loss of insurance and needing to reestablish care. Pt verbalizes understanding to return to hospital for any and all care if needed for labor precautions discussed.

## 2023-12-03 NOTE — OR SURGEON
Immediate Post OP Note    PreOp Diagnosis:   1.)  Intrauterine pregnancy at 37 weeks and 5 days gestation.  2.)  Active labor.  3.)  Spontaneous rupture of membranes.  4.)  Fetal breech presentation (footling presentation).    PostOp Diagnosis:   1.)  Intrauterine pregnancy at 37 weeks and 5 days gestation.  2.)  Active labor.  3.)  Spontaneous rupture of membranes.  4.)  Fetal breech presentation (footling presentation).  5.)  Live term male  delivered in breech presentation with Apgar scores of 2 and 9 at one and five minutes respectively and a  weight which has not yet been made available to me but which I estimate to be approximately 3.8 kg or so.    Procedure(s):   SECTION, PRIMARY - Wound Class: Clean Contaminated    Surgeon(s):  ORVILLE Koo M.D.    Anesthesiologist/Type of Anesthesia:  Anesthesiologist: Sam Hayden D.O./Spinal    Surgical Staff:  Circulator: Janeth Miranda R.N.  Scrub Person: Kisha Haddad  L&JESSE Circulator Assistant: Janeth Velasquez R.N.  L&JESSE Baby  Nurse: Arabella Daniels RJUSTIN; Jane Ngo R.N.    Specimens removed if any:  None.    Estimated Blood Loss:   Approximately 500 cc.    Findings:   1.)  Live term male  delivered in breech presentation with Apgar scores of 2 and 9 at one and five minutes respectively and a  weight which has either not yet been measured or not yet been made available to me but which I estimate to be approximately 3.8 kg.  2.)  Normal uterus.    Complications:   None.        12/3/2023 11:02 AM Clayton Cook M.D.

## 2023-12-03 NOTE — ANESTHESIA POSTPROCEDURE EVALUATION
Patient: Thania Dias    Procedure Summary       Date: 23 Room / Location: [facility] OR 01 / SURGERY LABOR AND DELIVERY    Anesthesia Start: 935 Anesthesia Stop:     Procedure:  SECTION, PRIMARY (Abdomen) Diagnosis:       Status post  delivery      (Breech)    Surgeons: Clayton Cook M.D. Responsible Provider: Sam Hayden D.O.    Anesthesia Type: spinal ASA Status: 2 - Emergent            Final Anesthesia Type: spinal  Last vitals  BP   Blood Pressure: 132/85    Temp   36 °C (96.8 °F)    Pulse   98   Resp   16    SpO2          Anesthesia Post Evaluation    Patient location during evaluation: PACU  Patient participation: complete - patient participated  Level of consciousness: awake and alert    Airway patency: patent  Anesthetic complications: no  Cardiovascular status: hemodynamically stable  Respiratory status: acceptable  Hydration status: euvolemic    PONV: none    patient able to participate, but full recovery from regional anesthesia has not occurred and is not expected within the stipulated timeframe for the completion of the evaluation      There were no known notable events for this encounter.     Nurse Pain Score: 0 (NPRS)

## 2023-12-03 NOTE — ANESTHESIA PROCEDURE NOTES
Spinal Block    Date/Time: 12/3/2023 9:43 AM    Performed by: Sam Hayden D.O.  Authorized by: Sam Hayden D.O.    Start Time:  12/3/2023 9:43 AM  Reason for Block: primary anesthetic    patient identified, IV checked, site marked, risks and benefits discussed, surgical consent, monitors and equipment checked, pre-op evaluation and timeout performed    Patient Position:  Sitting  Prep: ChloraPrep, patient draped and sterile technique    Monitoring:  Blood pressure, continuous pulse oximetry and heart rate  Approach:  Midline  Location:  L3-4  Injection Technique:  Single-shot  Skin infiltration:  Lidocaine  Strength:  1%  Dose:  3ml  Needle Type:  Jake  Needle Gauge:  25 G  CSF flowing pre/post injection:  Yes  Sensory Level:  T4

## 2023-12-03 NOTE — OP REPORT
DATE OF SERVICE:  2023     PREOPERATIVE DIAGNOSES:  1.  Intrauterine pregnancy at 37 weeks and 5 days gestation.  2.  Active labor.  3.  Spontaneous rupture of membranes with spontaneous labor occurring within   24 hours after term premature rupture of membranes.  4.  Fetal breech presentation (footling presentation).     POSTOPERATIVE DIAGNOSES:  1.  Intrauterine pregnancy at 37 weeks and 5 days gestation.  2.  Active labor.  3.  Spontaneous rupture of membranes with spontaneous labor occurring within   24 hours after term premature rupture of membranes.  4.  Fetal breech presentation (footling presentation).  5.  Live term male , delivered in a breech presentation with Apgar   scores of 2 and 9 at 1 and 5 minutes respectively and a  weight of 3,515   grams.     PROCEDURE:  Primary  section.     SURGEON:  Clayton Cook MD     ASSISTANT:  Angel Lew MD     ANESTHESIA:  Spinal.     ANESTHESIOLOGIST:  Sam Hayden MD     FINDINGS:  1.  Live term male , delivered in breech presentation with Apgar scores   of 2 and 9 at 1 and 5 minutes respectively and a  weight of 3,515   grams.  2.  Normal uterus.     SPECIMENS:  None.     COMPLICATIONS:  None.     ESTIMATED BLOOD LOSS:  Approximately 500 mL.     DESCRIPTION OF PROCEDURE:  After appropriate consents have been obtained, the   patient was taken to the operating room and given spinal anesthesia.  She was   prepped and draped in the dorsal supine position and a Rincon catheter was   noted to be in place and draining urine.  Anesthesia was tested and noted to   be excellent.  A Pfannenstiel incision was made in the lower abdomen using a   scalpel and this incision continued deeply through subcutaneous tissues with a   scalpel and the anterior rectus fascia was identified and incised   transversely with a scalpel and this incision was extended bilaterally with   the Bovie electrocautery.  The superior aspect of the  fascial incision was   doubly grasped with Kocher clamps and undermined from the underlying rectus   muscle with both blunt dissection and Bovie electrocautery.  Next, inferior   aspect of fascial incision was similarly doubly grasped with Kocher clamps and   undermined from the underlying rectus muscle with both blunt dissection and   Bovie electrocautery.  The midline of the rectus muscle was identified in the   midline of the rectus muscle was doubly grasped with Saba clamps and these   Saba clamps were elevated and the midline of the rectus muscle was incised   between these two Saba clamps with Bovie electrocautery and this incision   created in the midline of the rectus muscle was continued superiorly and   inferiorly with Bovie electrocautery.  Blunt dissection of the preperitoneal   adipose tissues allows identification of the parietal peritoneum, which was   doubly grasped with Saba clamps and incised with Bovie electrocautery with   care taken to avoid the bladder and this incision extended superiorly and   inferiorly with Bovie electrocautery with care taken to avoid the bladder.  An   Ta O self-retaining retractor was inserted and set up in the usual   fashion and found to provide excellent exposure of the anterior lower uterine   segment.  The serosa overlying the segment was grasped and incised with   Metzenbaum scissors with care taken to avoid the bladder and this incision   extended bilaterally with Metzenbaum scissors with care taken to avoid the   bladder.  The bladder was dissected away anteriorly, bluntly.  The anterior   lower uterine segment was incised transversely with a scalpel.  Bulging   membranes were seen and rupture of membranes revealed clear fluid.  The   hysterotomy was extended bilaterally with blunt dissection.  Hand was placed   in the intrauterine cavity around baby's breech.  Some fundal pressure was   used to deliver baby's breech.  First baby's breech was elevated up  and out of   the pelvis and then delivered through the hysterotomy.  Then fundal pressure   was applied to help deliver baby's breech further and baby's body is rotated   alternatingly clockwise and counterclockwise with fundal pressure.  Baby was   then delivered to the level of the low back and the mid back and one leg was   swept and delivered in the usual fashion, the other leg was swept and   delivered in the usual fashion.  Continued fundal pressure and continued   rotation of the baby's body alternatingly clockwise and counterclockwise   allowed delivery to the level of baby's scapula and then one arm was delivered   in the usual fashion and the other arm was delivered in the usual fashion.    Initial efforts to deliver baby's head were not successful, but fundal   pressure was placed and this along with the Cassandra Gooden Veit technique   was used to deliver baby's head.  Baby was somewhat flaccid and so the   umbilical cord was doubly clamped and cut and baby was handed to the awaiting   nurse.  The placenta was simply delivered and appeared to be intact.  The   uterus was not exteriorized.  The interior of the uterus was wiped clean of   products of conception.  The hysterotomy was reapproximated first with   placement of no less than 3 interrupted mattress sutures of 1 chromic in a   continuous interlocking suture of 1 chromic.  The uterus was palpated and   found to be firm.  The entire length of the hysterotomy repair was examined   and hemostasis was noted to be excellent.  The Ta O self-retaining   retractor was removed.  Lap and needle counts reported to be correct at this   time.  The parietal peritoneum was identified and reapproximated with simple   continuous suture using 3-0 Vicryl.  The rectus muscles were reapproximated in   midline with placement of multiple interrupted mattress sutures of 2-0   chromic.  Hemostasis was noted to be excellent.  The anterior rectus fascia   was  identified and reapproximated with a continuous simple suture of 1 Vicryl.    The wound was copiously irrigated and drained and hemostasis was noted to be   excellent.  The subcutaneous tissues were reapproximated with simple   continuous suture using a 3-0 Vicryl.  Finally, the skin was reapproximated   with placement of many interrupted buried sutures of 4-0 Monocryl placed in   the dermis and at least one such suture was placed for every 1 cm of length   along the entire length of the skin incision.  The skin incision was thus   reapproximated nicely in this way.  The procedure was terminated with final   lap and needle counts reported to be correct x2 at the end of the procedure.    The patient tolerated the procedure well and sent to postanesthesia recovery   in stable condition.        ______________________________  Clayton Cook MD    MED/MAYA    DD:  12/03/2023 11:24  DT:  12/03/2023 13:14    Job#:  [id number]    CC:MD Kathe Ferrari MD

## 2023-12-03 NOTE — PROGRESS NOTES
0620: Pt arrives to labor and delivery reporting LOF starting at 0500. Denies UC's, denies vaginal bleeding.   EFM and TOCO applied. Pt grossly ruptured. Sterile spec preformed and fern collected. SVE 3/50/-2.   Fern sent.   0650: Dr Dennies called. Admission orders received for primary  section secondary to breech presentation. Pt reports eating at approx 0200.   0705: Report given to Zenobia ANGULO RN.

## 2023-12-03 NOTE — PROGRESS NOTES
0700 - Report received. POC discussed. Pre op in progress.   0925 - Pt called out complaining of painful contractions. Consented to SVE. Fetal parts felt upon exam. No cervix felt. Updated Dr Cook. Pt to roll back to OR.   0933 - Pt transported to OR 1 in stable condition via gurney.   1050 - Pt transferred to PACU 1 in stable condition.   1250 - Pt tx to S341 in stable condition with infant in arms. Bands matched x2, cuddles active. Report given to Jenna BARAHONA.

## 2023-12-03 NOTE — CARE PLAN
Problem: Knowledge Deficit -   Goal: Patient/support person will demonstrate understanding regarding  section  Outcome: Progressing     Problem: Psychosocial - L&D  Goal: Patient's level of anxiety will decrease  Outcome: Progressing   The patient is Stable - Low risk of patient condition declining or worsening         Progress made toward(s) clinical / shift goals:  Discussed POC with pt and family with  iPad. Pt expressed understanding.     Patient is not progressing towards the following goals:

## 2023-12-03 NOTE — PROGRESS NOTES
1205 -Patient transferred from labor and delivery. Two RN verification of infant and parent armbands. Report received from Janeth ANGULO RN. Patient oriented to unit, call light, emergency light, and infant security. Assessment completed, fundus firm at u, lochia light. Patient has pandya in place draining yellow urine without problems. Patient declines intervention for pain at this time. Pitocin infusing at 125 ml/hr. Patient encouraged to call with needs. Rounding in place. Bed is locked and in lowest position, call light within reach.

## 2023-12-04 LAB
ERYTHROCYTE [DISTWIDTH] IN BLOOD BY AUTOMATED COUNT: 41.6 FL (ref 35.9–50)
HCT VFR BLD AUTO: 33.6 % (ref 37–47)
HGB BLD-MCNC: 11.3 G/DL (ref 12–16)
MCH RBC QN AUTO: 29.1 PG (ref 27–33)
MCHC RBC AUTO-ENTMCNC: 33.6 G/DL (ref 32.2–35.5)
MCV RBC AUTO: 86.6 FL (ref 81.4–97.8)
PLATELET # BLD AUTO: 236 K/UL (ref 164–446)
PMV BLD AUTO: 11 FL (ref 9–12.9)
RBC # BLD AUTO: 3.88 M/UL (ref 4.2–5.4)
WBC # BLD AUTO: 14.5 K/UL (ref 4.8–10.8)

## 2023-12-04 PROCEDURE — 85027 COMPLETE CBC AUTOMATED: CPT

## 2023-12-04 PROCEDURE — 700102 HCHG RX REV CODE 250 W/ 637 OVERRIDE(OP): Performed by: SPECIALIST

## 2023-12-04 PROCEDURE — 36415 COLL VENOUS BLD VENIPUNCTURE: CPT

## 2023-12-04 PROCEDURE — A9270 NON-COVERED ITEM OR SERVICE: HCPCS | Performed by: STUDENT IN AN ORGANIZED HEALTH CARE EDUCATION/TRAINING PROGRAM

## 2023-12-04 PROCEDURE — 700102 HCHG RX REV CODE 250 W/ 637 OVERRIDE(OP): Performed by: STUDENT IN AN ORGANIZED HEALTH CARE EDUCATION/TRAINING PROGRAM

## 2023-12-04 PROCEDURE — 700111 HCHG RX REV CODE 636 W/ 250 OVERRIDE (IP): Mod: JZ | Performed by: STUDENT IN AN ORGANIZED HEALTH CARE EDUCATION/TRAINING PROGRAM

## 2023-12-04 PROCEDURE — 770002 HCHG ROOM/CARE - OB PRIVATE (112)

## 2023-12-04 PROCEDURE — A9270 NON-COVERED ITEM OR SERVICE: HCPCS | Performed by: SPECIALIST

## 2023-12-04 RX ADMIN — ACETAMINOPHEN 1000 MG: 500 TABLET, FILM COATED ORAL at 06:06

## 2023-12-04 RX ADMIN — KETOROLAC TROMETHAMINE 30 MG: 30 INJECTION, SOLUTION INTRAMUSCULAR; INTRAVENOUS at 11:39

## 2023-12-04 RX ADMIN — PRENATAL WITH FERROUS FUM AND FOLIC ACID 1 TABLET: 3080; 920; 120; 400; 22; 1.84; 3; 20; 10; 1; 12; 200; 27; 25; 2 TABLET ORAL at 08:58

## 2023-12-04 RX ADMIN — ACETAMINOPHEN 1000 MG: 500 TABLET, FILM COATED ORAL at 17:57

## 2023-12-04 RX ADMIN — ACETAMINOPHEN 1000 MG: 500 TABLET, FILM COATED ORAL at 23:44

## 2023-12-04 RX ADMIN — ACETAMINOPHEN 1000 MG: 500 TABLET, FILM COATED ORAL at 11:39

## 2023-12-04 RX ADMIN — KETOROLAC TROMETHAMINE 30 MG: 30 INJECTION, SOLUTION INTRAMUSCULAR; INTRAVENOUS at 06:06

## 2023-12-04 RX ADMIN — DOCUSATE SODIUM 100 MG: 100 CAPSULE, LIQUID FILLED ORAL at 08:58

## 2023-12-04 RX ADMIN — KETOROLAC TROMETHAMINE 30 MG: 30 INJECTION, SOLUTION INTRAMUSCULAR; INTRAVENOUS at 00:08

## 2023-12-04 RX ADMIN — ACETAMINOPHEN 1000 MG: 500 TABLET, FILM COATED ORAL at 00:08

## 2023-12-04 ASSESSMENT — EDINBURGH POSTNATAL DEPRESSION SCALE (EPDS)
I HAVE LOOKED FORWARD WITH ENJOYMENT TO THINGS: AS MUCH AS I EVER DID
I HAVE FELT SCARED OR PANICKY FOR NO GOOD REASON: NO, NOT AT ALL
THINGS HAVE BEEN GETTING ON TOP OF ME: YES, SOMETIMES I HAVEN'T BEEN COPING AS WELL AS USUAL
I HAVE BEEN SO UNHAPPY THAT I HAVE HAD DIFFICULTY SLEEPING: NOT AT ALL
I HAVE FELT SAD OR MISERABLE: NO, NOT AT ALL
I HAVE BLAMED MYSELF UNNECESSARILY WHEN THINGS WENT WRONG: YES, SOME OF THE TIME
I HAVE BEEN ANXIOUS OR WORRIED FOR NO GOOD REASON: NO, NOT AT ALL
I HAVE BEEN ABLE TO LAUGH AND SEE THE FUNNY SIDE OF THINGS: AS MUCH AS I ALWAYS COULD
THE THOUGHT OF HARMING MYSELF HAS OCCURRED TO ME: NEVER
I HAVE BEEN SO UNHAPPY THAT I HAVE BEEN CRYING: NO, NEVER

## 2023-12-04 ASSESSMENT — PAIN DESCRIPTION - PAIN TYPE
TYPE: SURGICAL PAIN

## 2023-12-04 NOTE — PROGRESS NOTES
Post Partum Progress Note    Name:   Thania Dias   Date/Time:  2023 - 8:31 AM  Chief Admitting Dx:   delivery indicated due to breech presentation [O32.1XX0]  Delivery Type:   for breech  Post-Op/Post Partum Days #:  1    Subjective:  Abdominal pain: no  Ambulating:   yes  Tolerating liquids:  yes  Tolerating food:  yes common adult  Flatus:   yes  BM:    no  Bleeding:   without any bleeding  Voiding:   yes  Dizziness:   no  Feeding:   breast    Vitals:    23 0100 23 0200 23 0228 23 0611   BP:   102/71 114/58   Pulse: 64 66 60 61   Resp: 18 18 17 18   Temp:   36.6 °C (97.9 °F) 36.3 °C (97.4 °F)   TempSrc:   Temporal Temporal   SpO2: 96% 95% 95% 100%   Weight:       Height:           Exam:  Breast: Tenderness no  Abdomen: Abdomen soft, non-tender. BS normal. No masses,  No organomegaly  Fundal Tenderness:  no  Fundus Firm: yes  Incision: dry and intact  Below umbilicus: yes  Perineum: perineum intact  Lochia: mild  Extremities: Normal extremities, peripheral pulses and reflexes normal    Meds:  Current Facility-Administered Medications   Medication Dose    oxytocin (Pitocin) infusion (for post delivery)  125 mL/hr    oxytocin (Pitocin) injection 10 Units  10 Units    acetaminophen (Tylenol) tablet 1,000 mg  1,000 mg    ketorolac (Toradol) injection 30 mg  30 mg    oxyCODONE immediate-release (Roxicodone) tablet 5 mg  5 mg    oxyCODONE immediate release (Roxicodone) tablet 10 mg  10 mg    HYDROmorphone (Dilaudid) injection 0.2 mg  0.2 mg    HYDROmorphone (Dilaudid) injection 0.4 mg  0.4 mg    ePHEDrine injection 10 mg  10 mg    ondansetron (Zofran) syringe/vial injection 4 mg  4 mg    diphenhydrAMINE (Benadryl) injection 12.5 mg  12.5 mg    naloxone HCl (Narcan) 20 mg in  mL infusion  0.25 mcg/kg/hr    diphenhydrAMINE (Benadryl) injection 12.5 mg  12.5 mg    Or    diphenhydrAMINE (Benadryl) injection 25 mg  25 mg    Or    naloxone HCl (Narcan) 20 mg in   mL infusion  0.4 mg/hr    lactated ringers infusion      acetaminophen (Tylenol) tablet 1,000 mg  1,000 mg    Followed by    [START ON 2023] acetaminophen (Tylenol) tablet 1,000 mg  1,000 mg    oxyCODONE immediate-release (Roxicodone) tablet 5 mg  5 mg    oxyCODONE immediate release (Roxicodone) tablet 10 mg  10 mg    ondansetron (Zofran) syringe/vial injection 4 mg  4 mg    Or    ondansetron (Zofran ODT) dispertab 4 mg  4 mg    diphenhydrAMINE (Benadryl) tablet/capsule 25 mg  25 mg    Or    diphenhydrAMINE (Benadryl) injection 25 mg  25 mg    docusate sodium (Colace) capsule 100 mg  100 mg    prenatal plus vitamin (Stuartnatal 1+1) 27-1 MG tablet 1 Tablet  1 Tablet    simethicone (Mylicon) chewable tablet 125 mg  125 mg    calcium carbonate (Tums) chewable tab 1,000 mg  1,000 mg       Labs:   Recent Labs     23  2132 23  0720 23  0628   WBC 5.9 7.4 14.5*   RBC 4.42 4.53 3.88*   HEMOGLOBIN 13.0 13.2 11.3*   HEMATOCRIT 38.2 39.3 33.6*   MCV 86.4 86.8 86.6   MCH 29.4 29.1 29.1   MCHC 34.0 33.6 33.6   RDW 42.5 43.2 41.6   PLATELETCT 250 257 236   MPV 11.1 11.7 11.0       Assessment:  Chief Admitting Dx:   delivery indicated due to breech presentation [O32.1XX0]  Delivery Type:   for breech  Tubal Ligation:  no    Plan:  Continue routine post partum care.  Ambulate  Continue to breastfeed    Kathe Herzog M.D.

## 2023-12-04 NOTE — LACTATION NOTE
This note was copied from a baby's chart.  Mom is a 24 y/o P2 who delivered baby boy weighing 7 # 12 oz at 37.5 wks.Mom reports that she did not breast feed her first baby because he did not latch. She pumped for 2 wks before her milk stopped producing.   Mom has mostly given bottle and baby is using pacifier but mom states she desires to breast feed. She breast fed four other times  LC sat mom on the couch and assisted baby into football hold on left side. Baby just fed 15 mls about twenty minutes prior but baby  was alert. Baby did latch a few times but would come of the nipple and appeared disinterested after a few minutes.   LC reviewed feeding plan with mom . Mom will express colostrum on to nipple when attempting to latch. If baby does not have an effective feeding mom will provide a bottle and use manual pump if desires.  A manual pump was ordered for bedside RN to bring in.   Mom is offering more bottles than putting to breast at this time.   Mom did not have any questions and allowed lactation to do most of the latching and supporting baby at breast.  An electric pump should be set up if mom is attempting to breast at each feeding more than offering a bottles.  Lactation to follow up if needed

## 2023-12-04 NOTE — PROGRESS NOTES
Report received from Jenna BARAHONA. Pt assessment complete. Reviewed POC for this evening including pandya care, ambulation, breastfeeding frequency, and call light. Pt encouraged to call with needs at any time.     2120: report given to Sana BARAHONA.

## 2023-12-05 ENCOUNTER — PHARMACY VISIT (OUTPATIENT)
Dept: PHARMACY | Facility: MEDICAL CENTER | Age: 23
End: 2023-12-05
Payer: COMMERCIAL

## 2023-12-05 VITALS
BODY MASS INDEX: 35.36 KG/M2 | OXYGEN SATURATION: 98 % | HEIGHT: 66 IN | HEART RATE: 83 BPM | DIASTOLIC BLOOD PRESSURE: 71 MMHG | WEIGHT: 220 LBS | TEMPERATURE: 97.7 F | RESPIRATION RATE: 19 BRPM | SYSTOLIC BLOOD PRESSURE: 124 MMHG

## 2023-12-05 PROCEDURE — 700102 HCHG RX REV CODE 250 W/ 637 OVERRIDE(OP): Performed by: SPECIALIST

## 2023-12-05 PROCEDURE — A9270 NON-COVERED ITEM OR SERVICE: HCPCS | Performed by: SPECIALIST

## 2023-12-05 PROCEDURE — RXMED WILLOW AMBULATORY MEDICATION CHARGE: Performed by: OBSTETRICS & GYNECOLOGY

## 2023-12-05 RX ORDER — OXYCODONE HYDROCHLORIDE AND ACETAMINOPHEN 5; 325 MG/1; MG/1
1 TABLET ORAL EVERY 6 HOURS PRN
Qty: 20 TABLET | Refills: 0 | Status: SHIPPED | OUTPATIENT
Start: 2023-12-05 | End: 2023-12-10

## 2023-12-05 RX ADMIN — DOCUSATE SODIUM 100 MG: 100 CAPSULE, LIQUID FILLED ORAL at 08:31

## 2023-12-05 RX ADMIN — OXYCODONE 5 MG: 5 TABLET ORAL at 08:50

## 2023-12-05 RX ADMIN — ACETAMINOPHEN 1000 MG: 500 TABLET, FILM COATED ORAL at 05:07

## 2023-12-05 RX ADMIN — ACETAMINOPHEN 1000 MG: 500 TABLET, FILM COATED ORAL at 11:54

## 2023-12-05 RX ADMIN — PRENATAL WITH FERROUS FUM AND FOLIC ACID 1 TABLET: 3080; 920; 120; 400; 22; 1.84; 3; 20; 10; 1; 12; 200; 27; 25; 2 TABLET ORAL at 08:31

## 2023-12-05 ASSESSMENT — PAIN DESCRIPTION - PAIN TYPE: TYPE: SURGICAL PAIN

## 2023-12-05 NOTE — PROGRESS NOTES
Assumed patient care. Pt is resting comfortably with baby skin to skin. Pt declines pain at this time. Patient assessment completed. Discussed infant bulb suction and emergency call light. POC reviewed with patient all questions answered. Will continue to monitor, call light in reach.

## 2023-12-05 NOTE — CARE PLAN
The patient is Stable - Low risk of patient condition declining or worsening    Shift Goals  Clinical Goals: adequate pain control  Patient Goals: pain control, latch infant  Family Goals: support    Progress made toward(s) clinical / shift goals:  pt medicated for pain. Pt also ambulated round unit         Problem: Pain - Standard  Goal: Alleviation of pain or a reduction in pain to the patient’s comfort goal  Outcome: Progressing     Problem: Early Mobilization - Post Surgery  Goal: Early mobilization post surgery  Outcome: Progressing     Patient is not progressing towards the following goals:

## 2023-12-05 NOTE — CARE PLAN
The patient is Stable - Low risk of patient condition declining or worsening    Shift Goals  Clinical Goals: VSS,light lochia,incision dressing clean,dry and intact  Patient Goals: rest  Family Goals: rest    Progress made toward(s) clinical / shift goals:      Problem: Pain  Goal: Patient's pain will be alleviated or reduced to the patient’s comfort goal  Outcome: Progressing  Note: Pain is well controlled by the schedule pain medications.     Problem: Knowledge Deficit - Postpartum  Goal: Patient will verbalize and demonstrate understanding of self and infant care  Outcome: Progressing  Note: Patient is bonding well with infant. Performing self care.     Problem: Infection - Postpartum  Goal: Postpartum patient will be free of signs and symptoms of infection  Outcome: Progressing  Note: No signs and symptoms of  infection noted.      Problem: Early Mobilization - Post Surgery  Goal: Early mobilization post surgery  Outcome: Progressing  Note: The patient is ambulating inside her room. Encouraged to walk in the hallway.       Patient is not progressing towards the following goals:       on unit

## 2023-12-05 NOTE — PROGRESS NOTES
Report received from Amanda BARAHONA this am. Patient's awake and alert. Assumed care. Call light is within reach.    @ 0830: Discussed plan of care. Assessment done. Medicated for pain. The patient is ambulating well and voiding without difficulty.     @ 1340: The patient is doing well. Car seat is ready and available for baby. Explained the  screen test for baby. Re-educate to feed infant every 3 hours and burp.    @ 1500: Discharge instruction discussed. Handed and explained her home medication. The patient  stated that she understand and no question at this time.     @ 1520:Discharged home via wheelchair. The patient is doing well.

## 2023-12-12 NOTE — DISCHARGE SUMMARY
Date of admission:  [facility], December 3, 2023.  Date of discharge:  2023.  Admission diagnoses:  1.) Intrauterine pregnancy at 37 weeks and 5 days gestation.  2.) Spontaneous rupture of membranes.  3.) Labor starting within 24 hours after term premature rupture of membranes and the patient is in active labor.  4.) Fetal breech presentation (footling breech presentation).  5.) The patient had been in [place] for three months and during this time had not had any prenatal care.  Discharge diagnoses:  1.) Intrauterine pregnancy at 37 weeks and 5 days gestation.  2.) Spontaneous rupture of membranes.  3.) Labor starting within 24 hours after term premature rupture of membranes and the patient is in active labor.  4.) Fetal breech presentation (footling breech presentation).  5.) The patient had been in [place] for three months and during this time had not had any prenatal care.  6.) Live term male  delivered in breech presentation with Apgar scores of 2 and 9 at one and five minutes respectively and a  weight of 3,515 grams.  Procedures:  Primary low transverse  section.  Hospital course:  The patient had prenatal care with Dr. Jenn Salcido but recently the patient had was in [place] for three months and so for the past three months this patient has not been seen for prenatal care. She was in Labor & Delivery in the evening of the day before the day of admission (she presented to Labor & Delivery on 2023) and on that day (2023) she reported with complaints of irregular contractions. She stated that she is primiparous with one previous vaginal delivery and in the evening of the day before the current admission it was noted that she was 37 weeks and four days gestation and she presented to Labor & Delivery with complaints of uterine contractions occurring every few minutes. On presentation to Labor & Delivery in the  evening of the day prior to the day of admission her cervix was found to be be centimeters dilated and 50 percent effaced and -2 station and some mild variable decelerations were noted on the fetal heart tracing and so a biophysical profile was ordered and was 8 out of 8 and the ultrasound performed for this biophysical profile revealed fetal breech presentation. The fetal heart tracing subsequently was found to be reactive and the patient was sent home with instructions to follow up on an outpatient basis to reestablish prenatal care. However than the patient return to [place] labor & Delivery in the morning of the day of admission, namely [facility], December 3, 2023, and presented to Labor & Delivery in the morning of [facility], December 3, 2023 with complaints of leakage of fluid per vagina which she says started earlier in the morning namely about 5 o'clock in the morning of the day of admission and it was immediately obvious on exam on presentation to Labor & Delivery that rupture of membranes had occurred and the patient cervix was still three centimeters dilated and 50 percent effaced and -3 station. A fern test was sent in was positive. And so because of spontaneous rupture of membranes at term (namely 37 weeks and 5 days gestation) and because of fetal breech presentation the decision was made to proceed with  section. And so, later that morning the patient was delivered via primary low transverse  section and she was delivered of a live term male  (delivered in breech presentation) with Apgar scores of 2 and 9 at one and five minutes respectively and  weight of 3,515 grams. It's the procedure was without complications.  The patient's preoperative hemoglobin was 13.2 grams per deciliter and her postoperative hemoglobin a postoperative day number one was 11.3 grams per deciliter.  On 2023 (postoperative day 2) sees the patient says that other than for some soreness  she felt fine and has no problems or complaints and she was amulet again urinating and tolerating regular diet and requested to be discharged home and so she was discharged home on that day, namely Tuesday, December 5, 2023 (postoperative day number two) in stable condition with prescription for analgesics and instructions to follow up with Dr. Salcido in about two weeks for postoperative visit and to call or contact us at any time should she ever have any problems or questions or complaints and she said that she would do so.  Clayton Cook M.D.

## 2023-12-27 NOTE — ED NOTES
-- DO NOT REPLY / DO NOT REPLY ALL --  -- Message is from [facility] [employer] ([facility]) --    General Patient Message: Patient returning a MA phone call. Patient advised that she would like for the medication to be sent via [facility]. Please follow up if necessary, thanks.        Caller Information         Type Contact Phone/Fax    12/27/2023 07:12 AM CST Fax (Incoming) [facility] ([facility] - [place] [address] [address] (Pharmacy) [phone]    12/27/2023 10:07 AM CST Phone (Outgoing) Halle Peralta (Self) [phone] (M)    Left Message     12/27/2023 10:18 AM CST Phone (Incoming) Halle Peralta (Self) [phone] (M)          Alternative phone number: No    Can a detailed message be left? Yes    Message Turnaround: [place]:    Refer to site's KB page for routing instructions    Please give this turnaround time to the caller:   \"You can expect to receive a response 2-3 business days after your provider's clinical team reviews the message\"               EP TO BEDSIDE TO UPDATE PT AND FAMILY ON POC.

## 2024-07-09 NOTE — TELEPHONE ENCOUNTER
"Chief Complaint  Hypertension and Diabetes    Subjective           Lana Padilla presents to [facility]  History of Present Illness  Patient reports today to discuss low-dose CT scan and diabetes medications.    Patient reports taking Jardiance however states it has not been available at pharmacy would like to know if she needs to change her medication.  Patient reports she recently started Mounjaro.  Reports feeling no different than when she was taking semaglutide.  Patient states she is making stronger efforts with diet control.    Patient reports having hyperlipidemia, hypertension and states her low-dose CT scan noted extensive coronary calcification.  Patient also reports family history of heart disease.  States her parents had heart attack at 55.  She would like referral to cardiology for workup.  Hypertension    Diabetes      Objective   Vital Signs:   There were no vitals taken for this visit.    Estimated body mass index is 35.19 kg/m² as calculated from the following:    Height as of 1/16/24: 167.6 cm (66\").    Weight as of 5/9/24: 98.9 kg (218 lb).     Physical Exam   Pulmonary/Chest: Effort normal.   Psychiatric: She has a normal mood and affect.     Result Review :                   Assessment and Plan      Diagnoses and all orders for this visit:    1. Type 2 diabetes mellitus with diabetic polyneuropathy, without long-term current use of insulin (Primary)  Assessment & Plan:  Patient will continue with Mounjaro.  Provided her with samples of Jardiance as it is not available at the pharmacy today.  Patient will continue amitriptyline for neuropathy.      2. High risk medication use  Assessment & Plan:  Patient has discontinued gabapentin      3. Essential hypertension  Assessment & Plan:  Hypertension is stable and controlled  Continue current treatment regimen.  Dietary sodium restriction.  Weight loss.  Blood pressure will be reassessed in 6 months.    Orders:  -     " ----- Message from Verona Moyer D.O. sent at 7/15/2020  3:44 PM PDT -----  This patient is Bulgarian-speaking only.  She established care in the hospital at 18 weeks and soon after had a  delivery.  The placenta pathology is showing that she had an infection inside of the uterus.  I explained to her at the time of admission that this was my concern and it is now supported by the pathology results.  Please call the patient to follow-up and see how she is doing after her 18-week delivery and make sure she has no signs of infection.  She should follow-up for an office visit to check and see how she is doing in about 2 weeks.    [id number] Called pt, unable to reach her. [facility]   Ambulatory Referral to Cardiology    4. Family history of heart disease  Assessment & Plan:  Provided patient with referral to cardiology for risk stratification.      5. Class 2 severe obesity due to excess calories with serious comorbidity and body mass index (BMI) of 35.0 to 35.9 in adult  Assessment & Plan:  Patient's (There is no height or weight on file to calculate BMI.) indicates that they are morbidly/severely obese (BMI > 40 or > 35 with obesity - related health condition) with health conditions that include hypertension, diabetes mellitus, and dyslipidemias . Weight is unchanged. BMI  is above average; BMI management plan is completed. We discussed low calorie, low carb based diet program, portion control, increasing exercise, and pharmacologic options including Mounjaro .       6. Coronary artery calcification seen on CT scan  Assessment & Plan:  Patient provided with referral to cardiology for further workup    Orders:  -     Ambulatory Referral to Cardiology        Follow Up     Return in about 6 months (around 1/9/2025).  Patient was given instructions and counseling regarding her condition or for health maintenance advice. Please see specific information pulled into the AVS if appropriate.     Mode of Visit: Video  Location of patient: home  Location of provider: [facility]  You have chosen to receive care through a telehealth visit.  The patient has signed the video visit consent form.  The visit included audio and video interaction. No technical issues occurred during this visit.

## (undated) DEVICE — GLOVE BIOGEL SZ 7.5 SURGICAL PF LTX - (50PR/BX 4BX/CA)

## (undated) DEVICE — DRESSING POST OP BORDER 4 X 10 (5EA/BX)

## (undated) DEVICE — PAD GROUNDING PRE-JELLED - (50EA/PK)

## (undated) DEVICE — KIT  I.V. START (100EA/CA)

## (undated) DEVICE — SUTURE 3-0 VICRYL PLUS CT-1 - 36 INCH (36/BX)

## (undated) DEVICE — PACK ROOM TURNOVER L&D (12/CA)

## (undated) DEVICE — SUTURE 1 VICRYL PLUS CTX - 36 INCH (36/BX)

## (undated) DEVICE — CATHETER IV NON-SAFETY 18 GA X 1 1/4 (50/BX 4BX/CA)

## (undated) DEVICE — TAPE CLOTH MEDIPORE 6 INCH - (12RL/CA)

## (undated) DEVICE — CANISTER SUCTION 3000ML MECHANICAL FILTER AUTO SHUTOFF MEDI-VAC NONSTERILE LF DISP  (40EA/CA)

## (undated) DEVICE — GLOVE BIOGEL SZ 7 SURGICAL PF LTX - (50PR/BX 4BX/CA)

## (undated) DEVICE — SUTURE 3-0 MONOCRYL PLUS PS-1 - 27 INCH (36/BX)

## (undated) DEVICE — SET EXTENSION WITH 2 PORTS (48EA/CA) ***PART #2C8610 IS A SUBSTITUTE*****

## (undated) DEVICE — SUTURE 2-0 CHROMIC GUT CT-1 27 (36PK/BX)"

## (undated) DEVICE — SUTURE 1 CHROMIC CTX ETHICON - (36PK/BX)

## (undated) DEVICE — SODIUM CHL IRRIGATION 0.9% 1000ML (12EA/CA)

## (undated) DEVICE — PACK C-SECTION (2EA/CA)

## (undated) DEVICE — BLANKET UNDERBODY FULL ACCES - (5/CA)

## (undated) DEVICE — HEAD HOLDER JUNIOR/ADULT

## (undated) DEVICE — TRAY SPINAL ANESTHESIA NON-SAFETY (10/CA)

## (undated) DEVICE — SLEEVE, SEQUENTIAL CALF REG

## (undated) DEVICE — RETRACTOR O C SECTION X-LRY - (5/BX)

## (undated) DEVICE — CHLORAPREP 26 ML APPLICATOR - ORANGE TINT(25/CA)

## (undated) DEVICE — TUBING CLEARLINK DUO-VENT - C-FLO (48EA/CA)

## (undated) DEVICE — WATER IRRIGATION STERILE 1000ML (12EA/CA)

## (undated) DEVICE — MAT PATIENT POSITIONING PREVALON (10EA/CA)

## (undated) DEVICE — PAD LAP STERILE 18 X 18 - (5/PK 40PK/CA)